# Patient Record
Sex: MALE | Race: WHITE | Employment: UNEMPLOYED | URBAN - NONMETROPOLITAN AREA
[De-identification: names, ages, dates, MRNs, and addresses within clinical notes are randomized per-mention and may not be internally consistent; named-entity substitution may affect disease eponyms.]

---

## 2019-05-24 ENCOUNTER — TRANSFERRED RECORDS (OUTPATIENT)
Dept: HEALTH INFORMATION MANAGEMENT | Facility: OTHER | Age: 59
End: 2019-05-24

## 2019-10-07 ENCOUNTER — HOSPITAL ENCOUNTER (OUTPATIENT)
Dept: RESPIRATORY THERAPY | Facility: OTHER | Age: 59
End: 2019-10-07

## 2019-10-07 PROCEDURE — 25000125 ZZHC RX 250

## 2019-10-07 PROCEDURE — 40000275 ZZH STATISTIC RCP TIME EA 10 MIN

## 2019-10-07 PROCEDURE — 94010 BREATHING CAPACITY TEST: CPT | Performed by: CHIROPRACTOR

## 2019-10-07 RX ORDER — ALBUTEROL SULFATE 0.83 MG/ML
2.5 SOLUTION RESPIRATORY (INHALATION) ONCE
Status: COMPLETED | OUTPATIENT
Start: 2019-10-07 | End: 2019-10-07

## 2019-10-07 RX ADMIN — ALBUTEROL SULFATE 2.5 MG: 2.5 SOLUTION RESPIRATORY (INHALATION) at 15:50

## 2021-05-11 ENCOUNTER — RECORDS - HEALTHEAST (OUTPATIENT)
Dept: VASCULAR SURGERY | Facility: CLINIC | Age: 61
End: 2021-05-11

## 2021-05-11 DIAGNOSIS — I87.8 CHRONIC VENOUS STASIS: ICD-10-CM

## 2021-05-11 DIAGNOSIS — L03.90 CELLULITIS: ICD-10-CM

## 2021-05-26 ENCOUNTER — COMMUNICATION - HEALTHEAST (OUTPATIENT)
Dept: VASCULAR SURGERY | Facility: CLINIC | Age: 61
End: 2021-05-26

## 2021-06-16 ENCOUNTER — HOSPITAL ENCOUNTER (INPATIENT)
Facility: CLINIC | Age: 61
LOS: 2 days | Discharge: HOME | DRG: 640 | End: 2021-06-18
Attending: EMERGENCY MEDICINE | Admitting: INTERNAL MEDICINE
Payer: MEDICARE

## 2021-06-16 ENCOUNTER — APPOINTMENT (OUTPATIENT)
Dept: CT IMAGING | Facility: CLINIC | Age: 61
DRG: 640 | End: 2021-06-16
Attending: EMERGENCY MEDICINE
Payer: MEDICARE

## 2021-06-16 ENCOUNTER — APPOINTMENT (OUTPATIENT)
Dept: GENERAL RADIOLOGY | Facility: CLINIC | Age: 61
DRG: 640 | End: 2021-06-16
Attending: EMERGENCY MEDICINE
Payer: MEDICARE

## 2021-06-16 DIAGNOSIS — J93.81 CHRONIC PNEUMOTHORAX: ICD-10-CM

## 2021-06-16 DIAGNOSIS — J44.1 COPD EXACERBATION (H): ICD-10-CM

## 2021-06-16 DIAGNOSIS — Z95.2 S/P AVR: ICD-10-CM

## 2021-06-16 DIAGNOSIS — Z86.79 S/P AORTIC ANEURYSM REPAIR: ICD-10-CM

## 2021-06-16 DIAGNOSIS — Z98.890 S/P AORTIC ANEURYSM REPAIR: ICD-10-CM

## 2021-06-16 LAB
ALBUMIN SERPL-MCNC: 3.3 G/DL (ref 3.4–5)
ALP SERPL-CCNC: 102 U/L (ref 40–150)
ALT SERPL W P-5'-P-CCNC: 37 U/L (ref 0–70)
ANION GAP SERPL CALCULATED.3IONS-SCNC: 4 MMOL/L (ref 3–14)
AST SERPL W P-5'-P-CCNC: 29 U/L (ref 0–45)
BASE EXCESS BLDV CALC-SCNC: 0.7 MMOL/L
BASE EXCESS BLDV CALC-SCNC: 2.6 MMOL/L
BASOPHILS # BLD AUTO: 0.1 10E9/L (ref 0–0.2)
BASOPHILS NFR BLD AUTO: 0.7 %
BILIRUB SERPL-MCNC: 0.5 MG/DL (ref 0.2–1.3)
BUN SERPL-MCNC: 12 MG/DL (ref 7–30)
CALCIUM SERPL-MCNC: 9.3 MG/DL (ref 8.5–10.1)
CHLORIDE SERPL-SCNC: 103 MMOL/L (ref 94–109)
CO2 BLDCOV-SCNC: 33 MMOL/L (ref 21–28)
CO2 SERPL-SCNC: 30 MMOL/L (ref 20–32)
CREAT SERPL-MCNC: 0.85 MG/DL (ref 0.66–1.25)
DIFFERENTIAL METHOD BLD: ABNORMAL
EOSINOPHIL # BLD AUTO: 0.2 10E9/L (ref 0–0.7)
EOSINOPHIL NFR BLD AUTO: 2.4 %
ERYTHROCYTE [DISTWIDTH] IN BLOOD BY AUTOMATED COUNT: 14.4 % (ref 10–15)
GFR SERPL CREATININE-BSD FRML MDRD: >90 ML/MIN/{1.73_M2}
GLUCOSE SERPL-MCNC: 105 MG/DL (ref 70–99)
HCO3 BLDV-SCNC: 29 MMOL/L (ref 21–28)
HCO3 BLDV-SCNC: 30 MMOL/L (ref 21–28)
HCT VFR BLD AUTO: 36.5 % (ref 40–53)
HGB BLD-MCNC: 11.6 G/DL (ref 13.3–17.7)
IMM GRANULOCYTES # BLD: 0 10E9/L (ref 0–0.4)
IMM GRANULOCYTES NFR BLD: 0.3 %
INTERPRETATION ECG - MUSE: NORMAL
LACTATE BLD-SCNC: 0.9 MMOL/L (ref 0.7–2)
LACTATE BLD-SCNC: 0.9 MMOL/L (ref 0.7–2.1)
LYMPHOCYTES # BLD AUTO: 1.5 10E9/L (ref 0.8–5.3)
LYMPHOCYTES NFR BLD AUTO: 16.6 %
MCH RBC QN AUTO: 31 PG (ref 26.5–33)
MCHC RBC AUTO-ENTMCNC: 31.8 G/DL (ref 31.5–36.5)
MCV RBC AUTO: 98 FL (ref 78–100)
MONOCYTES # BLD AUTO: 0.7 10E9/L (ref 0–1.3)
MONOCYTES NFR BLD AUTO: 7.6 %
NEUTROPHILS # BLD AUTO: 6.4 10E9/L (ref 1.6–8.3)
NEUTROPHILS NFR BLD AUTO: 72.4 %
NRBC # BLD AUTO: 0 10*3/UL
NRBC BLD AUTO-RTO: 0 /100
NT-PROBNP SERPL-MCNC: 2913 PG/ML (ref 0–900)
O2/TOTAL GAS SETTING VFR VENT: ABNORMAL %
O2/TOTAL GAS SETTING VFR VENT: ABNORMAL %
OXYHGB MFR BLDV: 42 %
OXYHGB MFR BLDV: 98 %
PCO2 BLDV: 61 MM HG (ref 40–50)
PCO2 BLDV: 62 MM HG (ref 40–50)
PCO2 BLDV: 67 MM HG (ref 40–50)
PH BLDV: 7.28 PH (ref 7.32–7.43)
PH BLDV: 7.3 PH (ref 7.32–7.43)
PH BLDV: 7.3 PH (ref 7.32–7.43)
PLATELET # BLD AUTO: 152 10E9/L (ref 150–450)
PO2 BLDV: 124 MM HG (ref 25–47)
PO2 BLDV: 129 MM HG (ref 25–47)
PO2 BLDV: 27 MM HG (ref 25–47)
POTASSIUM SERPL-SCNC: 4.4 MMOL/L (ref 3.4–5.3)
PROCALCITONIN SERPL-MCNC: <0.05 NG/ML
PROT SERPL-MCNC: 7.5 G/DL (ref 6.8–8.8)
RBC # BLD AUTO: 3.74 10E12/L (ref 4.4–5.9)
SAO2 % BLDV FROM PO2: 98 %
SODIUM SERPL-SCNC: 137 MMOL/L (ref 133–144)
TROPONIN I BLD-MCNC: 0.04 UG/L (ref 0–0.08)
WBC # BLD AUTO: 8.9 10E9/L (ref 4–11)

## 2021-06-16 PROCEDURE — 96372 THER/PROPH/DIAG INJ SC/IM: CPT | Performed by: EMERGENCY MEDICINE

## 2021-06-16 PROCEDURE — 83880 ASSAY OF NATRIURETIC PEPTIDE: CPT | Performed by: EMERGENCY MEDICINE

## 2021-06-16 PROCEDURE — 250N000011 HC RX IP 250 OP 636: Performed by: INTERNAL MEDICINE

## 2021-06-16 PROCEDURE — 99223 1ST HOSP IP/OBS HIGH 75: CPT | Mod: AI | Performed by: INTERNAL MEDICINE

## 2021-06-16 PROCEDURE — 250N000013 HC RX MED GY IP 250 OP 250 PS 637: Performed by: EMERGENCY MEDICINE

## 2021-06-16 PROCEDURE — 80053 COMPREHEN METABOLIC PANEL: CPT | Performed by: EMERGENCY MEDICINE

## 2021-06-16 PROCEDURE — 250N000009 HC RX 250: Performed by: INTERNAL MEDICINE

## 2021-06-16 PROCEDURE — 71045 X-RAY EXAM CHEST 1 VIEW: CPT

## 2021-06-16 PROCEDURE — 120N000001 HC R&B MED SURG/OB

## 2021-06-16 PROCEDURE — 94660 CPAP INITIATION&MGMT: CPT

## 2021-06-16 PROCEDURE — 84145 PROCALCITONIN (PCT): CPT | Performed by: INTERNAL MEDICINE

## 2021-06-16 PROCEDURE — 82803 BLOOD GASES ANY COMBINATION: CPT

## 2021-06-16 PROCEDURE — 94640 AIRWAY INHALATION TREATMENT: CPT | Mod: 76

## 2021-06-16 PROCEDURE — 83605 ASSAY OF LACTIC ACID: CPT | Performed by: EMERGENCY MEDICINE

## 2021-06-16 PROCEDURE — 82805 BLOOD GASES W/O2 SATURATION: CPT | Performed by: EMERGENCY MEDICINE

## 2021-06-16 PROCEDURE — C9803 HOPD COVID-19 SPEC COLLECT: HCPCS

## 2021-06-16 PROCEDURE — 250N000011 HC RX IP 250 OP 636: Performed by: EMERGENCY MEDICINE

## 2021-06-16 PROCEDURE — 5A09357 ASSISTANCE WITH RESPIRATORY VENTILATION, LESS THAN 24 CONSECUTIVE HOURS, CONTINUOUS POSITIVE AIRWAY PRESSURE: ICD-10-PCS | Performed by: EMERGENCY MEDICINE

## 2021-06-16 PROCEDURE — 94640 AIRWAY INHALATION TREATMENT: CPT

## 2021-06-16 PROCEDURE — 250N000009 HC RX 250: Performed by: EMERGENCY MEDICINE

## 2021-06-16 PROCEDURE — 36415 COLL VENOUS BLD VENIPUNCTURE: CPT | Performed by: INTERNAL MEDICINE

## 2021-06-16 PROCEDURE — 250N000013 HC RX MED GY IP 250 OP 250 PS 637: Performed by: INTERNAL MEDICINE

## 2021-06-16 PROCEDURE — 83605 ASSAY OF LACTIC ACID: CPT

## 2021-06-16 PROCEDURE — 84484 ASSAY OF TROPONIN QUANT: CPT

## 2021-06-16 PROCEDURE — 87635 SARS-COV-2 COVID-19 AMP PRB: CPT | Performed by: EMERGENCY MEDICINE

## 2021-06-16 PROCEDURE — 71275 CT ANGIOGRAPHY CHEST: CPT

## 2021-06-16 PROCEDURE — 99285 EMERGENCY DEPT VISIT HI MDM: CPT | Mod: 25

## 2021-06-16 PROCEDURE — 85025 COMPLETE CBC W/AUTO DIFF WBC: CPT | Performed by: EMERGENCY MEDICINE

## 2021-06-16 PROCEDURE — 96374 THER/PROPH/DIAG INJ IV PUSH: CPT | Mod: 59

## 2021-06-16 PROCEDURE — 999N000157 HC STATISTIC RCP TIME EA 10 MIN

## 2021-06-16 RX ORDER — LIDOCAINE 40 MG/G
CREAM TOPICAL
Status: DISCONTINUED | OUTPATIENT
Start: 2021-06-16 | End: 2021-06-18 | Stop reason: HOSPADM

## 2021-06-16 RX ORDER — METHYLPREDNISOLONE SODIUM SUCCINATE 125 MG/2ML
125 INJECTION, POWDER, LYOPHILIZED, FOR SOLUTION INTRAMUSCULAR; INTRAVENOUS ONCE
Status: COMPLETED | OUTPATIENT
Start: 2021-06-16 | End: 2021-06-16

## 2021-06-16 RX ORDER — IOPAMIDOL 755 MG/ML
500 INJECTION, SOLUTION INTRAVASCULAR ONCE
Status: COMPLETED | OUTPATIENT
Start: 2021-06-16 | End: 2021-06-16

## 2021-06-16 RX ORDER — BUDESONIDE 0.5 MG/2ML
0.5 INHALANT ORAL 2 TIMES DAILY
Status: DISCONTINUED | OUTPATIENT
Start: 2021-06-16 | End: 2021-06-18 | Stop reason: HOSPADM

## 2021-06-16 RX ORDER — OXYCODONE HYDROCHLORIDE 5 MG/1
5-10 TABLET ORAL EVERY 6 HOURS PRN
COMMUNITY

## 2021-06-16 RX ORDER — ONDANSETRON 2 MG/ML
4 INJECTION INTRAMUSCULAR; INTRAVENOUS EVERY 6 HOURS PRN
Status: DISCONTINUED | OUTPATIENT
Start: 2021-06-16 | End: 2021-06-18 | Stop reason: HOSPADM

## 2021-06-16 RX ORDER — ACETAMINOPHEN 650 MG/1
650 SUPPOSITORY RECTAL EVERY 4 HOURS PRN
Status: DISCONTINUED | OUTPATIENT
Start: 2021-06-16 | End: 2021-06-18 | Stop reason: HOSPADM

## 2021-06-16 RX ORDER — HYDRALAZINE HYDROCHLORIDE 20 MG/ML
10 INJECTION INTRAMUSCULAR; INTRAVENOUS EVERY 4 HOURS PRN
Status: DISCONTINUED | OUTPATIENT
Start: 2021-06-16 | End: 2021-06-18 | Stop reason: HOSPADM

## 2021-06-16 RX ORDER — IPRATROPIUM BROMIDE AND ALBUTEROL SULFATE 2.5; .5 MG/3ML; MG/3ML
3 SOLUTION RESPIRATORY (INHALATION)
Status: DISCONTINUED | OUTPATIENT
Start: 2021-06-16 | End: 2021-06-18 | Stop reason: HOSPADM

## 2021-06-16 RX ORDER — NALOXONE HYDROCHLORIDE 0.4 MG/ML
0.2 INJECTION, SOLUTION INTRAMUSCULAR; INTRAVENOUS; SUBCUTANEOUS
Status: DISCONTINUED | OUTPATIENT
Start: 2021-06-16 | End: 2021-06-18 | Stop reason: HOSPADM

## 2021-06-16 RX ORDER — ALBUTEROL SULFATE 90 UG/1
1-2 AEROSOL, METERED RESPIRATORY (INHALATION) EVERY 4 HOURS PRN
COMMUNITY

## 2021-06-16 RX ORDER — NALOXONE HYDROCHLORIDE 0.4 MG/ML
0.4 INJECTION, SOLUTION INTRAMUSCULAR; INTRAVENOUS; SUBCUTANEOUS
Status: DISCONTINUED | OUTPATIENT
Start: 2021-06-16 | End: 2021-06-18 | Stop reason: HOSPADM

## 2021-06-16 RX ORDER — LANOLIN ALCOHOL/MO/W.PET/CERES
5000 CREAM (GRAM) TOPICAL DAILY
COMMUNITY

## 2021-06-16 RX ORDER — LOSARTAN POTASSIUM 50 MG/1
50 TABLET ORAL DAILY
COMMUNITY

## 2021-06-16 RX ORDER — FUROSEMIDE 20 MG
20 TABLET ORAL DAILY
Status: DISCONTINUED | OUTPATIENT
Start: 2021-06-17 | End: 2021-06-17

## 2021-06-16 RX ORDER — IPRATROPIUM BROMIDE AND ALBUTEROL SULFATE 2.5; .5 MG/3ML; MG/3ML
3 SOLUTION RESPIRATORY (INHALATION) ONCE
Status: COMPLETED | OUTPATIENT
Start: 2021-06-16 | End: 2021-06-16

## 2021-06-16 RX ORDER — ONDANSETRON 4 MG/1
4 TABLET, ORALLY DISINTEGRATING ORAL EVERY 6 HOURS PRN
Status: DISCONTINUED | OUTPATIENT
Start: 2021-06-16 | End: 2021-06-18 | Stop reason: HOSPADM

## 2021-06-16 RX ORDER — FUROSEMIDE 10 MG/ML
40 INJECTION INTRAMUSCULAR; INTRAVENOUS ONCE
Status: COMPLETED | OUTPATIENT
Start: 2021-06-16 | End: 2021-06-16

## 2021-06-16 RX ORDER — NITROGLYCERIN 0.4 MG/1
0.4 TABLET SUBLINGUAL EVERY 5 MIN PRN
Status: DISCONTINUED | OUTPATIENT
Start: 2021-06-16 | End: 2021-06-18 | Stop reason: HOSPADM

## 2021-06-16 RX ORDER — ACETAMINOPHEN 325 MG/1
650 TABLET ORAL EVERY 4 HOURS PRN
Status: DISCONTINUED | OUTPATIENT
Start: 2021-06-16 | End: 2021-06-18 | Stop reason: HOSPADM

## 2021-06-16 RX ORDER — ASPIRIN 81 MG/1
81 TABLET ORAL DAILY
Status: DISCONTINUED | OUTPATIENT
Start: 2021-06-16 | End: 2021-06-18 | Stop reason: HOSPADM

## 2021-06-16 RX ORDER — CEPHALEXIN 500 MG/1
500 CAPSULE ORAL EVERY 8 HOURS
COMMUNITY
Start: 2021-06-16 | End: 2021-06-22

## 2021-06-16 RX ORDER — BISACODYL 10 MG
10 SUPPOSITORY, RECTAL RECTAL DAILY PRN
Status: DISCONTINUED | OUTPATIENT
Start: 2021-06-16 | End: 2021-06-18 | Stop reason: HOSPADM

## 2021-06-16 RX ORDER — ASPIRIN 81 MG/1
81 TABLET ORAL DAILY
COMMUNITY

## 2021-06-16 RX ORDER — OXYCODONE HYDROCHLORIDE 5 MG/1
5 TABLET ORAL ONCE
Status: COMPLETED | OUTPATIENT
Start: 2021-06-16 | End: 2021-06-16

## 2021-06-16 RX ORDER — TERBUTALINE SULFATE 1 MG/ML
0.25 INJECTION, SOLUTION SUBCUTANEOUS ONCE
Status: COMPLETED | OUTPATIENT
Start: 2021-06-16 | End: 2021-06-16

## 2021-06-16 RX ORDER — AMOXICILLIN 250 MG
1 CAPSULE ORAL 2 TIMES DAILY PRN
Status: DISCONTINUED | OUTPATIENT
Start: 2021-06-16 | End: 2021-06-18 | Stop reason: HOSPADM

## 2021-06-16 RX ORDER — IPRATROPIUM BROMIDE AND ALBUTEROL SULFATE 2.5; .5 MG/3ML; MG/3ML
3 SOLUTION RESPIRATORY (INHALATION)
Status: DISCONTINUED | OUTPATIENT
Start: 2021-06-16 | End: 2021-06-16

## 2021-06-16 RX ORDER — NITROGLYCERIN 0.4 MG/1
0.4 TABLET SUBLINGUAL ONCE
Status: COMPLETED | OUTPATIENT
Start: 2021-06-16 | End: 2021-06-16

## 2021-06-16 RX ORDER — METOPROLOL TARTRATE 25 MG/1
12.5 TABLET, FILM COATED ORAL 2 TIMES DAILY
COMMUNITY

## 2021-06-16 RX ORDER — ALBUTEROL SULFATE 0.83 MG/ML
2.5 SOLUTION RESPIRATORY (INHALATION)
Status: DISCONTINUED | OUTPATIENT
Start: 2021-06-16 | End: 2021-06-18 | Stop reason: HOSPADM

## 2021-06-16 RX ORDER — OXYCODONE HYDROCHLORIDE 5 MG/1
5-10 TABLET ORAL EVERY 4 HOURS PRN
Status: DISCONTINUED | OUTPATIENT
Start: 2021-06-16 | End: 2021-06-18 | Stop reason: HOSPADM

## 2021-06-16 RX ORDER — HYDROMORPHONE HCL IN WATER/PF 6 MG/30 ML
0.2 PATIENT CONTROLLED ANALGESIA SYRINGE INTRAVENOUS
Status: DISCONTINUED | OUTPATIENT
Start: 2021-06-16 | End: 2021-06-18 | Stop reason: HOSPADM

## 2021-06-16 RX ORDER — FUROSEMIDE 20 MG
20 TABLET ORAL DAILY
COMMUNITY

## 2021-06-16 RX ORDER — ACETAMINOPHEN 500 MG
500-1000 TABLET ORAL EVERY 6 HOURS PRN
COMMUNITY

## 2021-06-16 RX ORDER — BUDESONIDE 0.5 MG/2ML
0.5 INHALANT ORAL 2 TIMES DAILY
COMMUNITY

## 2021-06-16 RX ORDER — METHYLPREDNISOLONE SODIUM SUCCINATE 40 MG/ML
40 INJECTION, POWDER, LYOPHILIZED, FOR SOLUTION INTRAMUSCULAR; INTRAVENOUS EVERY 8 HOURS
Status: DISCONTINUED | OUTPATIENT
Start: 2021-06-16 | End: 2021-06-17

## 2021-06-16 RX ORDER — AMOXICILLIN 250 MG
2 CAPSULE ORAL 2 TIMES DAILY PRN
Status: DISCONTINUED | OUTPATIENT
Start: 2021-06-16 | End: 2021-06-18 | Stop reason: HOSPADM

## 2021-06-16 RX ADMIN — SODIUM CHLORIDE 80 ML: 9 INJECTION, SOLUTION INTRAVENOUS at 11:11

## 2021-06-16 RX ADMIN — TERBUTALINE SULFATE 0.25 MG: 1 INJECTION SUBCUTANEOUS at 09:52

## 2021-06-16 RX ADMIN — FUROSEMIDE 40 MG: 10 INJECTION, SOLUTION INTRAMUSCULAR; INTRAVENOUS at 18:00

## 2021-06-16 RX ADMIN — BUDESONIDE 0.5 MG: 0.5 INHALANT RESPIRATORY (INHALATION) at 19:44

## 2021-06-16 RX ADMIN — IPRATROPIUM BROMIDE AND ALBUTEROL SULFATE 3 ML: .5; 3 SOLUTION RESPIRATORY (INHALATION) at 19:44

## 2021-06-16 RX ADMIN — IOPAMIDOL 80 ML: 755 INJECTION, SOLUTION INTRAVENOUS at 11:10

## 2021-06-16 RX ADMIN — OXYCODONE HYDROCHLORIDE 5 MG: 5 TABLET ORAL at 14:17

## 2021-06-16 RX ADMIN — Medication 12.5 MG: at 21:19

## 2021-06-16 RX ADMIN — METHYLPREDNISOLONE 40 MG: 40 INJECTION, POWDER, LYOPHILIZED, FOR SOLUTION INTRAMUSCULAR; INTRAVENOUS at 17:59

## 2021-06-16 RX ADMIN — OXYCODONE HYDROCHLORIDE 10 MG: 5 TABLET ORAL at 21:19

## 2021-06-16 RX ADMIN — NITROGLYCERIN 0.4 MG: 0.4 TABLET, ORALLY DISINTEGRATING SUBLINGUAL at 09:55

## 2021-06-16 RX ADMIN — METHYLPREDNISOLONE SODIUM SUCCINATE 125 MG: 125 INJECTION, POWDER, FOR SOLUTION INTRAMUSCULAR; INTRAVENOUS at 09:44

## 2021-06-16 RX ADMIN — IPRATROPIUM BROMIDE AND ALBUTEROL SULFATE 3 ML: .5; 3 SOLUTION RESPIRATORY (INHALATION) at 14:07

## 2021-06-16 RX ADMIN — ASPIRIN 81 MG: 81 TABLET ORAL at 17:58

## 2021-06-16 ASSESSMENT — ACTIVITIES OF DAILY LIVING (ADL)
ADLS_ACUITY_SCORE: 19
ADLS_ACUITY_SCORE: 18

## 2021-06-16 ASSESSMENT — ENCOUNTER SYMPTOMS
WHEEZING: 1
FEVER: 0
SLEEP DISTURBANCE: 1
SHORTNESS OF BREATH: 1

## 2021-06-16 ASSESSMENT — MIFFLIN-ST. JEOR: SCORE: 1575.1

## 2021-06-16 NOTE — ED NOTES
[facility]  ED Nurse Handoff Report    Dieudonne Chairez is a 60 year old male   ED Chief complaint: Shortness of Breath  . ED Diagnosis:   Final diagnoses:   None     Allergies: No Known Allergies    Code Status: Full Code  Activity level - Baseline/Home:  Independent. Activity Level - Current:   Assist of 1. Lift room needed: No. Bariatric: No   Needed: No   Isolation: No. Infection: Not Applicable.     Vital Signs:   Vitals:    06/16/21 1245 06/16/21 1300 06/16/21 1315 06/16/21 1330   BP: 104/60 114/61 132/76 116/69   Pulse: 86 87 97 92   Resp: 16 16 16 19   SpO2: 97% 97% 97% 96%       Cardiac Rhythm:  ,      Pain level:    Patient confused: No. Patient Falls Risk: Yes.   Elimination Status: Has voided   Patient Report - Initial Complaint: SOB. Focused Assessment: tachepnic, purse lipped breathing requiring BIPAP. Improved and now on 3 L NC   Tests Performed: Chest xray, chest CT, blood work. Abnormal Results:   Labs Ordered and Resulted from Time of ED Arrival Up to the Time of Departure from the ED   BLOOD GAS VENOUS AND OXYHGB - Abnormal; Notable for the following components:       Result Value    Ph Venous 7.28 (*)     PCO2 Venous 62 (*)     PO2 Venous 124 (*)     Bicarbonate Venous 29 (*)     All other components within normal limits   CBC WITH PLATELETS DIFFERENTIAL - Abnormal; Notable for the following components:    RBC Count 3.74 (*)     Hemoglobin 11.6 (*)     Hematocrit 36.5 (*)     All other components within normal limits   COMPREHENSIVE METABOLIC PANEL - Abnormal; Notable for the following components:    Glucose 105 (*)     Albumin 3.3 (*)     All other components within normal limits   NT PROBNP INPATIENT - Abnormal; Notable for the following components:    N-Terminal Pro BNP Inpatient 2,913 (*)     All other components within normal limits   BLOOD GAS VENOUS AND OXYHGB - Abnormal; Notable for the following components:    Ph Venous 7.30 (*)     PCO2 Venous 61 (*)      Bicarbonate Venous 30 (*)     All other components within normal limits   ISTAT  GASES LACTATE ARISTEO POCT - Abnormal; Notable for the following components:    Ph Venous 7.30 (*)     PCO2 Venous 67 (*)     PO2 Venous 129 (*)     Bicarbonate Venous 33 (*)     All other components within normal limits   LACTIC ACID WHOLE BLOOD   ISTAT TROPONIN NURSING POCT   ISTAT CG4 GASES LACTATE ARISTEO NURSING POCT   TROPONIN POCT     CTA Chest with Contrast   Preliminary Result   IMPRESSION:   1. No evidence for pulmonary embolism.   2. Small right pleural effusion.   3. Postsurgical changes of an aortic valve replacement and tube graft   reconstruction of the ascending thoracic aorta. Mild hematoma with   tiny pockets of air about the tube graft, air deep and superficial to   a median sternotomy, and subcutaneous air in the right upper anterior   abdominal wall most likely are postsurgical in nature.      XR Chest Port 1 View   Final Result   IMPRESSION: Poststernotomy with valve replacement/repair. Monitoring   leads overlie the chest. Lower left chest pigtail drainage tube.   Pulmonary hyperinflation. No definite pneumothorax. Blunting of the   right costophrenic angle may reflect scarring or trace pleural fluid.   The lungs are otherwise clear. Normal heart size. No overt vascular   congestion or pulmonary edema.      ARTHUR OSORIO MD        .   Treatments provided: BIPAP, fluids, Nitroglycerin, Mag, terbutaline   Family Comments: Daughter at bedside  OBS brochure/video discussed/provided to patient:  No  ED Medications:   Medications   ipratropium - albuterol 0.5 mg/2.5 mg/3 mL (DUONEB) neb solution 3 mL (has no administration in time range)   nitroGLYcerin (NITROSTAT) sublingual tablet 0.4 mg (0.4 mg Sublingual Given 6/16/21 0955)   terbutaline (BRETHINE) injection 0.25 mg (0.25 mg Subcutaneous Given 6/16/21 0904)   methylPREDNISolone sodium succinate (solu-MEDROL) injection 125 mg (125 mg Intravenous Given 6/16/21 0915)    iopamidol (ISOVUE-370) solution 500 mL (80 mLs Intravenous Given 6/16/21 1110)   sodium chloride 0.9 % bag 500mL for CT scan flush use (80 mLs As instructed Given 6/16/21 1111)     Drips infusing:  No  For the majority of the shift, the patient's behavior Green. Interventions performed were NA.    Sepsis treatment initiated: No     Patient tested for COVID 19 prior to admission: YES    ED Nurse Name/Phone Number: Carla Gabriel RN,   1:37 PM  RECEIVING UNIT ED HANDOFF REVIEW    Above ED Nurse Handoff Report was reviewed:YEs  Reviewed by: Bridgett Blackburn, RN on June 16, 2021 at 3:45 PM   Did you vocera the ED RN: Yes

## 2021-06-16 NOTE — PLAN OF CARE
"16:00  Pt arrived to [facility] from E.D.  Assisted into bed and oriented to room.  Continue plan of care.    17:55 Text page sent to MD, \"Pt requesting TEDs stockings.  Is it ok if I order some for him?  Thanks\"    MD returned call.  Said its ok to order Amrit stockings  "

## 2021-06-16 NOTE — ED PROVIDER NOTES
History   Chief Complaint:  Shortness of Breath     The history is provided by the patient and the EMS personnel.      Dieudonne Chairez is a 60 year old male with history of aortic stenosis, hypertension and COPD who presents with shortness of breath. EMS tells us that they presented to the patients home after he called for shortness of breath. They also tell us that he had open heart surgery for an aortic aneurysm two and a half weks ago as well as a lower left lobe lugncollapse and was discharged yesterday from [place]. When EMS arrived at the patients home he was stating at 96% on 4L but continued to appear distressed. While in the ED the patient tells us that yesterday evening he had a sudden onset of shortness of breath that kept him from sleeping. The patient denies chest pain or fever.     Review of Systems   Constitutional: Negative for fever.   Respiratory: Positive for shortness of breath and wheezing.    Cardiovascular: Negative for chest pain.   Psychiatric/Behavioral: Positive for sleep disturbance.   All other systems reviewed and are negative.    Allergies:  The patient has no known allergies.     Medications:  Albuterol   Aspirin 81 mg   Cyanocobalamin   Combiventin  Lasix  Cozaar  Lopressor  Roxicodone  Amoxicillin   Keflex    Past Medical History:    Aortic stenosis  COPD  Hypertension     Past Surgical History:    Cardiac catheterization   Aortic valve replacement  Thoracic aortic valve replacement  Bronchoscopy     Family History:    COPD  Atrial fibrillation   Stroke   Hypertension   Stroke   Bicuspid AV    Social History:  The patient presents to the ED via EMS.     Physical Exam     Patient Vitals for the past 24 hrs:   BP Pulse Resp SpO2   06/16/21 0947 (!) 200/94 114 20 100 %       Physical Exam    Constitutional: Alert, attentive, GCS 15.Labored breathing, moderate distress, tripod position and speaking in short sentences.   HENT:    Nose: Nose normal.    Mouth/Throat: Oropharynx is  clear, mucous membranes are moist  Eyes: EOM are normal, anicteric, conjugate gaze  CV: tachycardic and rhythm; no murmur, sLarge sternotomy incision, margins are clean, dry and in tact.   Chest:  movement bilaterally. No wheezing. Left posterior pig tail catheter in place, skin appears c/d/i.   GI:  non tender. No distension. No guarding or rebound.    MSK: 2+ lower extremity edema.   Neurological: Alert, attentive, moving all extremities equally.   Skin: Skin is warm and dry.    Emergency Department Course   ECG  ECG taken at 0950, ECG read at 0950  Sinus tachycardia. Possible left atrial enlargement. Left axis deviation. Anteroseptal infarct, age undetermined. ST & T wave abnormality, consider lateral ischemia. Abnormal ECG.  Rate 107 bpm. MO interval 174 ms. QRS duration 90 ms. QT/QTc 328/437 ms. P-R-T axes 79 -84 103.     Imaging:    XR Port Chest 1 View:  Poststernotomy with valve replacement/repair. Monitoring  leads overlie the chest. Lower left chest pigtail drainage tube.  Pulmonary hyperinflation. No definite pneumothorax. Blunting of the  right costophrenic angle may reflect scarring or trace pleural fluid.  The lungs are otherwise clear. Normal heart size. No overt vascular  congestion or pulmonary edema. Reading per radiology.    CTA Chest w contrast:   1. No evidence for pulmonary embolism.  2. Small right pleural effusion.  3. Postsurgical changes of an aortic valve replacement and tube graft  reconstruction of the ascending thoracic aorta. Mild hematoma with  tiny pockets of air about the tube graft, air deep and superficial to  a median sternotomy, and subcutaneous air in the right upper anterior  abdominal wall most likely are postsurgical in nature. Reading per radiology.     Laboratory:    CBC: WBC 8.9, HGB 11.6(L),    CMP: Glucose 105(H); Albumin 3.3(L) o/w WNL (Creatinine 0.85)   Lactic acid (result time 0953) 0.9   Troponin (Collected 0952): 0.04  0954 VBG: pH: 7.30(L), PCO2: 67(H),  PO2: 129(H), Bicarbonate: 33(H), o/w WNL  1131 VBG: pH: 7.30(L), PCO2: 61(H), PO2: 27, Bicarbonate: 30(H), o/w WNL  BNP - 2,913(H)    Emergency Department Course:    Reviewed:  I reviewed nursing notes, vitals, past medical history and care everywhere    0940 I arrive at patients bedside  0941 Stat portable chest called for   0944 Solumedrol given   0946 CPAP in place   0947 Portable Xray taken   0955 I rechecked the patient and talked with his daughter, who is at the patients bedside, to gain further history.   1010 I rechecked the patient and discussed the findings and plan.   1035 I spoke with Dr. Gonzales, cardiothoracic surgeon, regarding this patient.   1055 I rechecked the patient at this time and discussed with him my conversation with the cardiothoracic surgeon and the plan of care moving forward.   1315 I spoke with Dr. Terrell regarding this patient.  1335 I spoke with Jimmie, the OR nurse for Dr. Gonzales, regarding this patient.    1430   I spoke with Dr. Zhu.   1430   Chest tube taken off suction and flutter valve replaced    Interventions:  0944 Solu-Medrol 125 mg IV   0952 Brethine 0.25 mg IV  0954 Nitrostat 0.4 mg sublingual  1110 Iopamidol 80 mL IV     Disposition:  The patient was admitted to the hospital under the care of Dr. Zhu.     Impression & Plan     Medical Decision Makin-year-old male past medical history significant for COPD who was discharged from [facility] in [facility] the day prior after 2-week hospitalization for open aortic valve replacement and aortic root aneurysm repair with postop course complicated by persistent left-sided pneumothorax status post pigtail catheter placement currently with a flutter valve presenting for sudden onset increased shortness of breath.  EMS noted sats in the 80s, on arrival here he was pursed lipped breathing, speaking in one-word sentences in the tripod position.  Patient was immediately placed on BiPAP after point-of-care ultrasound  showed no evidence of pneumothorax on the left confirmed on chest x-ray which shows his pigtail catheter in good position.  He was given DuoNeb's x3, Solu-Medrol, mag and terbutaline with significant improvement in his respiratory status and work of breathing.  Initial blood gas does show chronic CO2 retention and this was improved after BiPAP.  I did discuss this presentation with on-call CV surgeon through [facility], Dr. Hampton, who felt if there is no evidence of postop complication patient does not require transfer back to the [facility] system in [place].  CT PE study/aortic survey was negative except for postoperative changes given his significant improvement with treatment for COPD, I suspect this is the primary cause of his worsening respiratory status.  While on BiPAP, I did place his pigtail catheter to suction, did not have any air leak and after he was taken off BiPAP back onto nasal cannula this was hooked back up to a flutter valve.  In discussion with hospitalist service here, given no need for surgical intervention and isolated COPD exacerbation as the cause of his presentation they are comfortable keeping the patient here at [facility] and this was communicated back to the CV surgery team at [facility].  He does not have any fevers, he is currently on Keflex for a skin infection surrounding his pigtail catheter see no negation for antibiotic adjustment at this time.  He was admitted to medicine for continued treatment of COPD exacerbation in the setting of his postoperative period.    Critical Care Time: was 95 minutes for this patient excluding procedures    Covid-19  Dieudonne Chairez was evaluated during a global COVID-19 pandemic, which necessitated consideration that the patient might be at risk for infection with the SARS-CoV-2 virus that causes COVID-19.   Applicable protocols for evaluation were followed during the patient's care.   COVID-19 was considered as part of the patient's  evaluation. The plan for testing is:  a test was obtained during this visit.    Diagnosis:    ICD-10-CM    1. COPD exacerbation (H)  J44.1 Asymptomatic SARS-CoV-2 COVID-19 Virus (Coronavirus) by PCR   2. S/P AVR  Z95.2    3. S/P aortic aneurysm repair  Z98.890     Z86.79    4. Chronic pneumothorax  J93.81     Present on arrival. pigtail catheter in place     Ck Lundy MD  [employer]  3:05 PM 06/16/21     Scribe Disclosure:  Erlin KAM, am serving as a scribe at 9:48 AM on 6/16/2021 to document services personally performed by Ck Lundy MD, based on my observations and the provider's statements to me.          Ck Lundy MD  06/16/21 3543

## 2021-06-16 NOTE — ED TRIAGE NOTES
Pt arrives via EMS from home after experiencing SOB this morning. He is s/p open heart surgery for an aortic aneurysm 2.5 wks ago as well as a lower left lobe lung collapse and was discharged from [place] yesterday. Upon arrival he is sating 96% on 4L NC but continues to appear in distress, tacheypnic and utilizing pursed lipped breathing. Placed on BIPAP. 12 lead in rout was unremarkable.

## 2021-06-16 NOTE — PROGRESS NOTES
Pt weaned off bipap to 3L nasal cannula. Pt currently showing no signs of respiratory distress.    Respiratory Care will continue to follow and monitor this patient.   Ebony Kendrick  LRT, RRT,

## 2021-06-16 NOTE — H&P
[facility]    History and Physical - Hospitalist Service       Date of Admission:  6/16/2021    Assessment & Plan   Dieudonne Chairez is a 60 year old male admitted on 6/16/2021. He has a past medical history significant for hypertension, COPD, tobacco use disorder, aortic stenosis, aortic aneurysm, and aortic valve replacement with aortic aneurysm repair 2 weeks ago.  He discharged from hospital after his aortic valve replacement with aortic aneurysm repair yesterday.  He returned to emergency room today due to shortness of breath.    1.  COPD exacerbation.  Initially requiring BiPAP therapy in the emergency room.  Improved with nebs.  Currently oxygenating well and symptoms much improved on 3 L of supplemental oxygen per nasal cannula.  -Start IV methylprednisolone 40 mg every 8 hours.  -Duo nebs 4 times a day.  -Albuterol more often if needed.  -Supplemental oxygen.  -Check procalcitonin level.    2.  Acute hypoxic and hypercapnic respiratory failure.  Possibly multifactorial.  COPD exacerbation contributing.  Suspect recent open heart surgery with some fluid overload likely also contributing.  -IV furosemide 40 mg once.  -Treat COPD as noted above.  -Supplemental oxygen as needed.    3.  Fluid overload.  In setting of recent aortic valve replacement and aortic aneurysm repair.  -Give IV furosemide 40 mg once now.  -Weigh daily.  -Strict intake and output monitoring.  -Cardiology consult.    4.  Recent pneumothorax with chest tube in place.  No obvious residual pneumothorax on imaging.  -Thoracic surgery consult to manage chest tube.    5.  Tobacco use disorder.  Last cigarette more than 2 weeks ago.  -I did encourage continued smoking cessation.    6.  Recent aortic valve replacement.    -Cardiology consult.    7.  Hypertension.  Quite hypertensive at time of presentation.  Blood pressures actually became low during time in ER.  -Restart metoprolol 12.5 mg twice a day.  -Hold losartan  tonight.  Reevaluate in the morning.  -IV hydralazine if needed.     Diet: Combination Diet Low Saturated Fat Na <2400mg Diet    DVT Prophylaxis: Pneumatic Compression Devices  Farah Catheter: not present  Code Status: Full Code           Disposition Plan   Expected discharge: 2 - 3 days, recommended to prior living arrangement     DO CK [facility]  Contact information available via [facility] Paging/Directory      ______________________________________________________________________    Chief Complaint   Shortness of breath.    History is obtained from the patient    History of Present Illness   Dieudonne Chairez is a 60 year old male who has a past medical history significant for hypertension, COPD, tobacco use disorder, aortic stenosis, aortic aneurysm, and aortic valve replacement with aortic aneurysm repair 2 weeks ago.  His surgery was performed in [facility].  He discharged from the hospital yesterday.  He traveled to see his daughter in [facility] yesterday.  Began feeling short of breath yesterday afternoon after arriving at his daughter's house.  Continued to feel short of breath overnight.  Shortness of breath was getting worse.  He did try his inhalers at home with no improvement in symptoms.  Felt that he could not catch his breath at all today.  Noticing wheezing.  Presented to emergency room for further evaluation.  He is not having any new chest pain with this.  Does still have some soreness at his surgery site.  No significant cough.  No fevers or chills.  No other acute complaints.  Feeling much better after medications given in the ER.    Review of Systems    The 10 point Review of Systems is negative other than noted in the HPI     Past Medical History    I have reviewed this patient's medical history and updated it with pertinent information if needed.     Hypertension.  COPD.  Tobacco use disorder.  Aortic stenosis.  Status post aortic valve  replacement.  Aortic aneurysm status post repair.    Past Surgical History   I have reviewed this patient's surgical history and updated it with pertinent information if needed.    Social History   I have reviewed this patient's social history and updated it with pertinent information if needed.  Quit smoking 2 weeks ago.  Occasionally drinks alcohol.    Family History     Multiple family members with COPD.  Mother with atrial fibrillation and stroke.    Prior to Admission Medications   Prior to Admission Medications   Prescriptions Last Dose Informant Patient Reported? Taking?   Ipratropium-Albuterol (COMBIVENT IN)   Yes Yes   Sig: Inhale 1 puff into the lungs 4 times daily   acetaminophen (TYLENOL) 500 MG tablet 6/16/2021 at am  Yes Yes   Sig: Take 500-1,000 mg by mouth every 6 hours as needed for mild pain   albuterol (PROAIR HFA/PROVENTIL HFA/VENTOLIN HFA) 108 (90 Base) MCG/ACT inhaler prn  Yes Yes   Sig: Inhale 1-2 puffs into the lungs every 4 hours as needed for shortness of breath / dyspnea or wheezing   aspirin 81 MG EC tablet Didn't start yet  Yes Yes   Sig: Take 81 mg by mouth daily   budesonide (PULMICORT) 0.5 MG/2ML neb solution 6/16/2021 at am  Yes Yes   Sig: Take 0.5 mg by nebulization 2 times daily   cephALEXin (KEFLEX) 500 MG capsule 6/16/2021 at am  Yes Yes   Sig: Take 500 mg by mouth every 8 hours x7 days   cyanocobalamin (VITAMIN B-12) 1000 MCG tablet   Yes Yes   Sig: Take 5,000 mcg by mouth daily   furosemide (LASIX) 20 MG tablet 6/16/2021 at am  Yes Yes   Sig: Take 20 mg by mouth daily   losartan (COZAAR) 50 MG tablet 6/16/2021 at am  Yes Yes   Sig: Take 50 mg by mouth daily   metoprolol tartrate (LOPRESSOR) 25 MG tablet 6/16/2021 at am  Yes Yes   Sig: Take 12.5 mg by mouth 2 times daily   oxyCODONE (ROXICODONE) 5 MG tablet prn  Yes Yes   Sig: Take 5-10 mg by mouth every 6 hours as needed for severe pain      Facility-Administered Medications: None     Allergies   No Known Allergies    Physical  Exam   Vital Signs: Temp: 99.5  F (37.5  C) Temp src: Oral BP: 130/72 Pulse: 102   Resp: 24 SpO2: 98 % O2 Device: Nasal cannula Oxygen Delivery: 2.5 LPM  Weight: 173 lbs 0 oz    Gen:  NAD, A&Ox3.  Eyes:  PERRL, sclera anicteric.  OP:  MMM, no lesions.  Neck:  Supple.  CV:  Regular, no murmurs.  Lung:  Mild wheeze b/l, normal effort.  Ab:  +BS, soft.  Skin:  Warm, dry to touch.  No rash.  Ext:  No pitting edema LE b/l.      Data   Data reviewed today: I reviewed all medications, new labs and imaging results over the last 24 hours. I personally reviewed the EKG tracing showing Sinus rhythm, no obvious acute ischemia.    Recent Labs   Lab 06/16/21  0953 06/16/21  0952   WBC 8.9  --    HGB 11.6*  --    MCV 98  --      --      --    POTASSIUM 4.4  --    CHLORIDE 103  --    CO2 30  --    BUN 12  --    CR 0.85  --    ANIONGAP 4  --    ISAAC 9.3  --    *  --    ALBUMIN 3.3*  --    PROTTOTAL 7.5  --    BILITOTAL 0.5  --    ALKPHOS 102  --    ALT 37  --    AST 29  --    TROPONIN  --  0.04

## 2021-06-16 NOTE — PHARMACY-ADMISSION MEDICATION HISTORY
Admission medication history interview status for this patient is complete. See [facility] admission navigator for allergy information, prior to admission medications and immunization status.     Medication history interview done, indicate source(s): Patient and Family  Medication history resources (including written lists, pill bottles, clinic record): Care Everywhere records  Pharmacy:      Changes made to PTA medication list:  Added:  All meds  Deleted:  None  Changed:  None    Actions taken by pharmacist (provider contacted, etc):None     Additional medication history information:None    Medication reconciliation/reorder completed by provider prior to medication history?  No      Prior to Admission medications    Medication Sig Last Dose Taking? Auth Provider   acetaminophen (TYLENOL) 500 MG tablet Take 500-1,000 mg by mouth every 6 hours as needed for mild pain 6/16/2021 at am Yes Unknown, Entered By History   albuterol (PROAIR HFA/PROVENTIL HFA/VENTOLIN HFA) 108 (90 Base) MCG/ACT inhaler Inhale 1-2 puffs into the lungs every 4 hours as needed for shortness of breath / dyspnea or wheezing prn Yes Unknown, Entered By History   aspirin 81 MG EC tablet Take 81 mg by mouth daily Didn't start yet Yes Unknown, Entered By History   budesonide (PULMICORT) 0.5 MG/2ML neb solution Take 0.5 mg by nebulization 2 times daily 6/16/2021 at am Yes Unknown, Entered By History   cephALEXin (KEFLEX) 500 MG capsule Take 500 mg by mouth every 8 hours x7 days 6/16/2021 at am Yes Unknown, Entered By History   cyanocobalamin (VITAMIN B-12) 1000 MCG tablet Take 5,000 mcg by mouth daily  Yes Unknown, Entered By History   furosemide (LASIX) 20 MG tablet Take 20 mg by mouth daily 6/16/2021 at am Yes Unknown, Entered By History   Ipratropium-Albuterol (COMBIVENT IN) Inhale 1 puff into the lungs 4 times daily  Yes Unknown, Entered By History   losartan (COZAAR) 50 MG tablet Take 50 mg by mouth daily 6/16/2021 at am Yes Unknown, Entered By History    metoprolol tartrate (LOPRESSOR) 25 MG tablet Take 12.5 mg by mouth 2 times daily 6/16/2021 at am Yes Unknown, Entered By History   oxyCODONE (ROXICODONE) 5 MG tablet Take 5-10 mg by mouth every 6 hours as needed for severe pain prn Yes Unknown, Entered By History

## 2021-06-17 LAB
ALBUMIN SERPL-MCNC: 2.7 G/DL (ref 3.4–5)
ALP SERPL-CCNC: 79 U/L (ref 40–150)
ALT SERPL W P-5'-P-CCNC: 33 U/L (ref 0–70)
ANION GAP SERPL CALCULATED.3IONS-SCNC: 5 MMOL/L (ref 3–14)
AST SERPL W P-5'-P-CCNC: 21 U/L (ref 0–45)
BILIRUB SERPL-MCNC: 0.3 MG/DL (ref 0.2–1.3)
BUN SERPL-MCNC: 20 MG/DL (ref 7–30)
CALCIUM SERPL-MCNC: 8.4 MG/DL (ref 8.5–10.1)
CHLORIDE SERPL-SCNC: 103 MMOL/L (ref 94–109)
CO2 SERPL-SCNC: 29 MMOL/L (ref 20–32)
CREAT SERPL-MCNC: 0.91 MG/DL (ref 0.66–1.25)
ERYTHROCYTE [DISTWIDTH] IN BLOOD BY AUTOMATED COUNT: 14.1 % (ref 10–15)
GFR SERPL CREATININE-BSD FRML MDRD: 90 ML/MIN/{1.73_M2}
GLUCOSE SERPL-MCNC: 154 MG/DL (ref 70–99)
HCT VFR BLD AUTO: 30.3 % (ref 40–53)
HGB BLD-MCNC: 9.9 G/DL (ref 13.3–17.7)
LABORATORY COMMENT REPORT: NORMAL
MCH RBC QN AUTO: 31 PG (ref 26.5–33)
MCHC RBC AUTO-ENTMCNC: 32.7 G/DL (ref 31.5–36.5)
MCV RBC AUTO: 95 FL (ref 78–100)
PLATELET # BLD AUTO: 145 10E9/L (ref 150–450)
POTASSIUM SERPL-SCNC: 4.3 MMOL/L (ref 3.4–5.3)
PROT SERPL-MCNC: 6 G/DL (ref 6.8–8.8)
RBC # BLD AUTO: 3.19 10E12/L (ref 4.4–5.9)
SARS-COV-2 RNA RESP QL NAA+PROBE: NEGATIVE
SODIUM SERPL-SCNC: 137 MMOL/L (ref 133–144)
SPECIMEN SOURCE: NORMAL
WBC # BLD AUTO: 6.6 10E9/L (ref 4–11)

## 2021-06-17 PROCEDURE — 250N000009 HC RX 250: Performed by: INTERNAL MEDICINE

## 2021-06-17 PROCEDURE — 250N000013 HC RX MED GY IP 250 OP 250 PS 637: Performed by: INTERNAL MEDICINE

## 2021-06-17 PROCEDURE — 94640 AIRWAY INHALATION TREATMENT: CPT

## 2021-06-17 PROCEDURE — 999N000156 HC STATISTIC RCP CONSULT EA 30 MIN

## 2021-06-17 PROCEDURE — 999N000157 HC STATISTIC RCP TIME EA 10 MIN

## 2021-06-17 PROCEDURE — 99221 1ST HOSP IP/OBS SF/LOW 40: CPT | Performed by: INTERNAL MEDICINE

## 2021-06-17 PROCEDURE — 80053 COMPREHEN METABOLIC PANEL: CPT | Performed by: INTERNAL MEDICINE

## 2021-06-17 PROCEDURE — 36415 COLL VENOUS BLD VENIPUNCTURE: CPT | Performed by: INTERNAL MEDICINE

## 2021-06-17 PROCEDURE — 250N000011 HC RX IP 250 OP 636: Performed by: INTERNAL MEDICINE

## 2021-06-17 PROCEDURE — 94640 AIRWAY INHALATION TREATMENT: CPT | Mod: 76

## 2021-06-17 PROCEDURE — 250N000012 HC RX MED GY IP 250 OP 636 PS 637: Performed by: INTERNAL MEDICINE

## 2021-06-17 PROCEDURE — 99232 SBSQ HOSP IP/OBS MODERATE 35: CPT | Performed by: INTERNAL MEDICINE

## 2021-06-17 PROCEDURE — 85027 COMPLETE CBC AUTOMATED: CPT | Performed by: INTERNAL MEDICINE

## 2021-06-17 PROCEDURE — 99221 1ST HOSP IP/OBS SF/LOW 40: CPT | Performed by: PHYSICIAN ASSISTANT

## 2021-06-17 PROCEDURE — 120N000001 HC R&B MED SURG/OB

## 2021-06-17 RX ORDER — CEPHALEXIN 500 MG/1
500 CAPSULE ORAL EVERY 6 HOURS SCHEDULED
Status: DISCONTINUED | OUTPATIENT
Start: 2021-06-17 | End: 2021-06-18 | Stop reason: HOSPADM

## 2021-06-17 RX ORDER — ALBUTEROL SULFATE 90 UG/1
2 AEROSOL, METERED RESPIRATORY (INHALATION) EVERY 4 HOURS PRN
Status: DISCONTINUED | OUTPATIENT
Start: 2021-06-17 | End: 2021-06-18 | Stop reason: HOSPADM

## 2021-06-17 RX ORDER — PREDNISONE 20 MG/1
60 TABLET ORAL
Status: COMPLETED | OUTPATIENT
Start: 2021-06-17 | End: 2021-06-17

## 2021-06-17 RX ORDER — PREDNISONE 20 MG/1
60 TABLET ORAL DAILY
Status: DISCONTINUED | OUTPATIENT
Start: 2021-06-18 | End: 2021-06-18 | Stop reason: HOSPADM

## 2021-06-17 RX ORDER — FUROSEMIDE 10 MG/ML
20 INJECTION INTRAMUSCULAR; INTRAVENOUS EVERY 12 HOURS
Status: DISCONTINUED | OUTPATIENT
Start: 2021-06-17 | End: 2021-06-18

## 2021-06-17 RX ADMIN — CEPHALEXIN 500 MG: 500 CAPSULE ORAL at 23:44

## 2021-06-17 RX ADMIN — BUDESONIDE 0.5 MG: 0.5 INHALANT RESPIRATORY (INHALATION) at 19:42

## 2021-06-17 RX ADMIN — ASPIRIN 81 MG: 81 TABLET ORAL at 09:45

## 2021-06-17 RX ADMIN — CEPHALEXIN 250 MG: 250 CAPSULE ORAL at 13:02

## 2021-06-17 RX ADMIN — FUROSEMIDE 20 MG: 20 TABLET ORAL at 09:45

## 2021-06-17 RX ADMIN — ALBUTEROL SULFATE 2 PUFF: 90 INHALANT RESPIRATORY (INHALATION) at 23:51

## 2021-06-17 RX ADMIN — IPRATROPIUM BROMIDE AND ALBUTEROL SULFATE 3 ML: .5; 3 SOLUTION RESPIRATORY (INHALATION) at 08:26

## 2021-06-17 RX ADMIN — METHYLPREDNISOLONE 40 MG: 40 INJECTION, POWDER, LYOPHILIZED, FOR SOLUTION INTRAMUSCULAR; INTRAVENOUS at 09:52

## 2021-06-17 RX ADMIN — OXYCODONE HYDROCHLORIDE 10 MG: 5 TABLET ORAL at 18:22

## 2021-06-17 RX ADMIN — ALBUTEROL SULFATE 2 PUFF: 90 INHALANT RESPIRATORY (INHALATION) at 18:23

## 2021-06-17 RX ADMIN — FUROSEMIDE 20 MG: 10 INJECTION, SOLUTION INTRAMUSCULAR; INTRAVENOUS at 13:02

## 2021-06-17 RX ADMIN — PREDNISONE 60 MG: 20 TABLET ORAL at 18:22

## 2021-06-17 RX ADMIN — ACETAMINOPHEN 650 MG: 325 TABLET, FILM COATED ORAL at 18:21

## 2021-06-17 RX ADMIN — IPRATROPIUM BROMIDE AND ALBUTEROL SULFATE 3 ML: .5; 3 SOLUTION RESPIRATORY (INHALATION) at 12:06

## 2021-06-17 RX ADMIN — ACETAMINOPHEN 650 MG: 325 TABLET, FILM COATED ORAL at 13:13

## 2021-06-17 RX ADMIN — BUDESONIDE 0.5 MG: 0.5 INHALANT RESPIRATORY (INHALATION) at 08:26

## 2021-06-17 RX ADMIN — FUROSEMIDE 20 MG: 10 INJECTION, SOLUTION INTRAMUSCULAR; INTRAVENOUS at 23:44

## 2021-06-17 RX ADMIN — ACETAMINOPHEN 650 MG: 325 TABLET, FILM COATED ORAL at 23:50

## 2021-06-17 RX ADMIN — CEPHALEXIN 500 MG: 500 CAPSULE ORAL at 18:22

## 2021-06-17 RX ADMIN — METHYLPREDNISOLONE 40 MG: 40 INJECTION, POWDER, LYOPHILIZED, FOR SOLUTION INTRAMUSCULAR; INTRAVENOUS at 00:41

## 2021-06-17 RX ADMIN — IPRATROPIUM BROMIDE AND ALBUTEROL SULFATE 3 ML: .5; 3 SOLUTION RESPIRATORY (INHALATION) at 19:42

## 2021-06-17 RX ADMIN — OXYCODONE HYDROCHLORIDE 10 MG: 5 TABLET ORAL at 13:13

## 2021-06-17 RX ADMIN — Medication 12.5 MG: at 20:52

## 2021-06-17 RX ADMIN — IPRATROPIUM BROMIDE AND ALBUTEROL SULFATE 3 ML: .5; 3 SOLUTION RESPIRATORY (INHALATION) at 16:03

## 2021-06-17 RX ADMIN — Medication 12.5 MG: at 09:45

## 2021-06-17 ASSESSMENT — ACTIVITIES OF DAILY LIVING (ADL)
ADLS_ACUITY_SCORE: 18
ADLS_ACUITY_SCORE: 16
ADLS_ACUITY_SCORE: 18
ADLS_ACUITY_SCORE: 16

## 2021-06-17 ASSESSMENT — MIFFLIN-ST. JEOR: SCORE: 1574.2

## 2021-06-17 NOTE — CONSULTS
"Cardiology Consultation      Dieudonne Chairez MRN# [id number]   YOB: 1960 Age: 60 year old   Date of Admission: 6/16/2021     Reason for consult:  Recent ascending aortic aneurysm repair and aortic valve replacement           Assessment and Plan:     1. Dyspnea from COPD exacerbation and pneumothorax, improving    Management as per hospital team      2. Recent ascending aortic aneurysm repair and bioprosthetic aortic valve replacement 5/28/2021 in [place]    LV function was normal.  No obstructive coronary artery disease on cardiac catheterization preceding the surgery.    Supportive care, no specific cardiac evaluation or therapy required unless specific cardiac symptoms.    May do cardiac rehab as outpatient and follow up at [facility] for cardiology follow up if convenient for him.    Please call with specific cardiac questions.               Chief Complaint:   Shortness of Breath           History of Present Illness:   This patient is a 60 year old male with COPD exacerbation who recently was hospitalized for severe bicuspid aortic stenosis with mean gradient 75 mmHg and ascending aorta 4.6-4.7 cm in [place].  The echocardiogram is 5/16/2021 and the cardiac catheterization is 5/26/2021 without obstructive disease.  Patient had surgery on 5/28/2021 and it was unremarkable.  He received bioprosthetic aortic valve and ascending aortic tube graft.    Currently he is hospitalized with dyspnea thought to be COPD exacerbation.  There is no specific cardiac question.  He did require brief diuresis, but he himself also does not feel like his current symptoms are related to his heart.    Lab Results   Component Value Date    TROPONIN 0.04 06/16/2021            Physical Exam:   Vitals were reviewed  Blood pressure 123/61, pulse 93, temperature 97.9  F (36.6  C), temperature source Oral, resp. rate 22, height 1.753 m (5' 9\"), weight 78.4 kg (172 lb 12.8 oz), SpO2 97 %.  Temperatures:  Current - Temp: 97.9  F " Approved  Expires: 9/1/19  Auth#: [id number]  Rep: Kamaljit Rider    Notified pt and pharmacy  (36.6  C); Max - Temp  Av.7  F (37.1  C)  Min: 97.9  F (36.6  C)  Max: 99.5  F (37.5  C)  Respiration range: Resp  Av  Min: 14  Max: 26  Pulse range: Pulse  Av.4  Min: 81  Max: 104  Blood pressure range: Systolic (24hrs), Av , Min:101 , Max:135   ; Diastolic (24hrs), Av, Min:52, Max:76    Pulse oximetry range: SpO2  Av.2 %  Min: 94 %  Max: 98 %    Intake/Output Summary (Last 24 hours) at 2021 1124  Last data filed at 2021 0945  Gross per 24 hour   Intake 366 ml   Output 700 ml   Net -334 ml     Constitutional:   awake, alert, cooperative, no apparent distress, and appears stated age     Eyes:   Lids and lashes normal, pupils equal, round and reactive to light, extra ocular muscles intact, sclera clear, conjunctiva normal     Neck:   supple, symmetrical, trachea midline, no JVD     Back:   symmetric     Lungs:   splinting     Cardiovascular:   Regular, I/VI systolic.  Sternotomy appears well healed.     Abdomen:   non-tender     Musculoskeletal:   motor strength is 5 out of 5 all extremities bilaterally     Neurologic:   Grossly nonfocal     Skin:   normal skin color, texture, turgor     Additional findings:                  Past Medical History:   I have reviewed this patient's past medical history  Bicuspid aortic valve and ascending aortic aneurysm          Past Surgical History:   I have reviewed this patient's past surgical history  Recent bioprosthetic AVR and ascending aortic repair            Social History:   I have reviewed this patient's social history  Longstanding smoker, recently quit.  Occasional alcohol.          Family History:   I have reviewed this patient's family history  Atrial fibrillation, stroke in mother.  Son with bicuspid aortic valve.          Allergies:   No Known Allergies          Medications:   I have reviewed this patient's current medications  Medications Prior to Admission   Medication Sig Dispense Refill Last Dose     acetaminophen (TYLENOL)  500 MG tablet Take 500-1,000 mg by mouth every 6 hours as needed for mild pain   6/16/2021 at am     albuterol (PROAIR HFA/PROVENTIL HFA/VENTOLIN HFA) 108 (90 Base) MCG/ACT inhaler Inhale 1-2 puffs into the lungs every 4 hours as needed for shortness of breath / dyspnea or wheezing   prn     aspirin 81 MG EC tablet Take 81 mg by mouth daily   Didn't start yet     budesonide (PULMICORT) 0.5 MG/2ML neb solution Take 0.5 mg by nebulization 2 times daily   6/16/2021 at am     cephALEXin (KEFLEX) 500 MG capsule Take 500 mg by mouth every 8 hours x7 days   6/16/2021 at am     cyanocobalamin (VITAMIN B-12) 1000 MCG tablet Take 5,000 mcg by mouth daily        furosemide (LASIX) 20 MG tablet Take 20 mg by mouth daily   6/16/2021 at am     Ipratropium-Albuterol (COMBIVENT IN) Inhale 1 puff into the lungs 4 times daily        losartan (COZAAR) 50 MG tablet Take 50 mg by mouth daily   6/16/2021 at am     metoprolol tartrate (LOPRESSOR) 25 MG tablet Take 12.5 mg by mouth 2 times daily   6/16/2021 at am     oxyCODONE (ROXICODONE) 5 MG tablet Take 5-10 mg by mouth every 6 hours as needed for severe pain   prn     Current Facility-Administered Medications Ordered in Epic   Medication Dose Route Frequency Last Rate Last Admin     acetaminophen (TYLENOL) Suppository 650 mg  650 mg Rectal Q4H PRN         acetaminophen (TYLENOL) tablet 650 mg  650 mg Oral Q4H PRN         albuterol (PROVENTIL) neb solution 2.5 mg  2.5 mg Nebulization Q2H PRN         aspirin EC tablet 81 mg  81 mg Oral Daily   81 mg at 06/17/21 0945     bisacodyl (DULCOLAX) Suppository 10 mg  10 mg Rectal Daily PRN         budesonide (PULMICORT) neb solution 0.5 mg  0.5 mg Nebulization BID   0.5 mg at 06/17/21 0826     furosemide (LASIX) tablet 20 mg  20 mg Oral Daily   20 mg at 06/17/21 0945     hydrALAZINE (APRESOLINE) injection 10 mg  10 mg Intravenous Q4H PRN         HYDROmorphone (DILAUDID) injection 0.2 mg  0.2 mg Intravenous Q2H PRN         ipratropium - albuterol  0.5 mg/2.5 mg/3 mL (DUONEB) neb solution 3 mL  3 mL Nebulization 4x daily   3 mL at 06/17/21 0826     lidocaine (LMX4) cream   Topical Q1H PRN         lidocaine (LMX4) cream   Topical Q1H PRN         lidocaine 1 % 0.1-1 mL  0.1-1 mL Other Q1H PRN         lidocaine 1 % 0.1-1 mL  0.1-1 mL Other Q1H PRN         magnesium hydroxide (MILK OF MAGNESIA) suspension 30 mL  30 mL Oral Daily PRN         melatonin tablet 1 mg  1 mg Oral At Bedtime PRN         methylPREDNISolone sodium succinate (solu-MEDROL) injection 40 mg  40 mg Intravenous Q8H   40 mg at 06/17/21 0952     metoprolol tartrate (LOPRESSOR) half-tab 12.5 mg  12.5 mg Oral BID   12.5 mg at 06/17/21 0945     naloxone (NARCAN) injection 0.2 mg  0.2 mg Intravenous Q2 Min PRN        Or     naloxone (NARCAN) injection 0.4 mg  0.4 mg Intravenous Q2 Min PRN        Or     naloxone (NARCAN) injection 0.2 mg  0.2 mg Intramuscular Q2 Min PRN        Or     naloxone (NARCAN) injection 0.4 mg  0.4 mg Intramuscular Q2 Min PRN         nitroGLYcerin (NITROSTAT) sublingual tablet 0.4 mg  0.4 mg Sublingual Q5 Min PRN         ondansetron (ZOFRAN-ODT) ODT tab 4 mg  4 mg Oral Q6H PRN        Or     ondansetron (ZOFRAN) injection 4 mg  4 mg Intravenous Q6H PRN         oxyCODONE (ROXICODONE) tablet 5-10 mg  5-10 mg Oral Q4H PRN   10 mg at 06/16/21 2119     senna-docusate (SENOKOT-S/PERICOLACE) 8.6-50 MG per tablet 1 tablet  1 tablet Oral BID PRN        Or     senna-docusate (SENOKOT-S/PERICOLACE) 8.6-50 MG per tablet 2 tablet  2 tablet Oral BID PRN         sodium chloride (PF) 0.9% PF flush 3 mL  3 mL Intravenous q1 min prn         sodium chloride (PF) 0.9% PF flush 3 mL  3 mL Intracatheter Q8H         sodium chloride (PF) 0.9% PF flush 3 mL  3 mL Intracatheter q1 min prn         No current Epic-ordered outpatient medications on file.             Review of Systems:   The 10 point Review of Systems is negative other than noted in the HPI            Data:   All laboratory data  reviewed  Results for orders placed or performed during the hospital encounter of 06/16/21 (from the past 24 hour(s))   CTA Chest with Contrast    Narrative    CTA CHEST WITH CONTRAST June 16, 2021 11:28 AM     HISTORY: Two week status post aortic valve replacement now with  shortness of breath.    TECHNIQUE: CT angiogram of the chest was performed following the  administration of 80 mL Isovue-370 contrast. Images are viewed in  multiple planes and 3-D reconstructions will be performed. Radiation  dose for this scan was reduced using automated exposure control,  adjustment of the mA and/or kV according to patient size, or iterative  reconstruction technique.    COMPARISON: None.    FINDINGS:    Vascular examination: There are postsurgical changes consistent with  aortic valve replacement and tube graft reconstruction of the  ascending thoracic aorta. There is a mild hematoma about the surgical  graft with tiny pockets of air. This would be consistent with recent  surgery. In addition, there are tiny pockets of air deep and  superficial to the sternotomy.    The thoracic aorta is otherwise patent without significant stenosis or  aneurysmal dilatation. The great vessels arising from the thoracic  aortic arch are patent without significant stenoses.    There is no evidence for a pulmonary embolism.    Soft tissues: There is a small right pleural effusion. There is a left  pleural pigtail catheter.  There are COPD changes within the lungs.  There is a 6 mm nodule within the medial left lower lobe on image 33.  There are 2-3 mm nodules in the posterior aspects of the left upper  lobe, and right lower lobe. There is a bandlike opacity within the  peripheral right lower lobe. This could represent atelectasis and/or  scarring.    There is subcutaneous emphysema along the right upper anterior  abdominal wall.    There are at least two 6-10 mm hypodensities in the left lobe of the  liver. These are too small to characterize  but most likely represent  cysts. Remaining solid organs in the abdomen appear grossly  unremarkable.      Impression    IMPRESSION:  1. No evidence for pulmonary embolism.  2. Small right pleural effusion.  3. Postsurgical changes of an aortic valve replacement and tube graft  reconstruction of the ascending thoracic aorta. Mild hematoma with  tiny pockets of air about the tube graft, air deep and superficial to  a median sternotomy, and subcutaneous air in the right upper anterior  abdominal wall most likely are postsurgical in nature.    ROSS GUZMAN MD   Blood gas venous and oxyhgb   Result Value Ref Range    Ph Venous 7.30 (L) 7.32 - 7.43 pH    PCO2 Venous 61 (H) 40 - 50 mm Hg    PO2 Venous 27 25 - 47 mm Hg    Bicarbonate Venous 30 (H) 21 - 28 mmol/L    FIO2 3L Nasal Cannula     Oxyhemoglobin Venous 42 %    Base Excess Venous 2.6 mmol/L   Asymptomatic SARS-CoV-2 COVID-19 Virus (Coronavirus) by PCR    Specimen: Nasopharyngeal   Result Value Ref Range    SARS-CoV-2 Virus Specimen Source Nasopharyngeal     SARS-CoV-2 PCR Result NEGATIVE     SARS-CoV-2 PCR Comment (Note)    Procalcitonin   Result Value Ref Range    Procalcitonin <0.05 ng/ml   Comprehensive metabolic panel   Result Value Ref Range    Sodium 137 133 - 144 mmol/L    Potassium 4.3 3.4 - 5.3 mmol/L    Chloride 103 94 - 109 mmol/L    Carbon Dioxide 29 20 - 32 mmol/L    Anion Gap 5 3 - 14 mmol/L    Glucose 154 (H) 70 - 99 mg/dL    Urea Nitrogen 20 7 - 30 mg/dL    Creatinine 0.91 0.66 - 1.25 mg/dL    GFR Estimate 90 >60 mL/min/[1.73_m2]    GFR Estimate If Black >90 >60 mL/min/[1.73_m2]    Calcium 8.4 (L) 8.5 - 10.1 mg/dL    Bilirubin Total 0.3 0.2 - 1.3 mg/dL    Albumin 2.7 (L) 3.4 - 5.0 g/dL    Protein Total 6.0 (L) 6.8 - 8.8 g/dL    Alkaline Phosphatase 79 40 - 150 U/L    ALT 33 0 - 70 U/L    AST 21 0 - 45 U/L   CBC with platelets   Result Value Ref Range    WBC 6.6 4.0 - 11.0 10e9/L    RBC Count 3.19 (L) 4.4 - 5.9 10e12/L    Hemoglobin 9.9 (L) 13.3  - 17.7 g/dL    Hematocrit 30.3 (L) 40.0 - 53.0 %    MCV 95 78 - 100 fl    MCH 31.0 26.5 - 33.0 pg    MCHC 32.7 31.5 - 36.5 g/dL    RDW 14.1 10.0 - 15.0 %    Platelet Count 145 (L) 150 - 450 10e9/L       No results found for: CHOL  No results found for: HDL  No results found for: LDL  No results found for: TRIG  No results found for: CHOLHDLRATIO  No results found for: TSH

## 2021-06-17 NOTE — PROGRESS NOTES
[facility]  Hospitalist Progress Note  Joselito Escobar MD 06/17/2021    Reason for Stay (Diagnosis): dyspnea         Assessment and Plan:      Summary of Stay: Dieudonne Chairez is a 60 year old male admitted on 6/16/2021. He has a past medical history significant for hypertension, COPD, tobacco use disorder, aortic stenosis, aortic aneurysm, and aortic valve replacement with aortic aneurysm repair 2 weeks ago.  He discharged from hospital after his aortic valve replacement with aortic aneurysm repair yesterday.  He returned to emergency room today due to shortness of breath.    Plans today  - change iv solumedrol to po prednisone with taper on discharge  - albuterol neb q2h prn  - lasix 20 mg iv q12h today for further diuresis  - oxygen with goal sats 90-95%     1.  possible COPD exacerbation.  Initially requiring BiPAP therapy in the emergency room.  Improved with nebs.  Currently oxygenating well and symptoms much improved on 3 L of supplemental oxygen per nasal cannula.  - transition from IV solumedrol to prednisone today  -Duo nebs 4 times a day.  -Albuterol more often if needed.  -Supplemental oxygen.  -procal level undetectable; no e/o PNA  - explained to pt and dtr that time - more than anything else - will improve his dyspnea sx as he is continuing to recover from major cardiac surgery in the setting of underlying chronic resp failure (COPD)     2.  Acute hypoxic and hypercapnic respiratory failure.  likely multifactorial, with largest contributor being recent surgery (AVR and aneurysm repair) in the setting of underlying COPD.  suspect some degree of volume overload from recent hospitalization and COPD exac contributing as well  -IV furosemide 20 mg IV q12h today  -Treat COPD as noted above.  -Supplemental oxygen as needed.        4.  Recent pneumothorax with chest tube in place.  No obvious residual pneumothorax on imaging.  -Thoracic surgery consult to manage chest tube.     5.   "Tobacco use disorder.  Last cigarette more than 2 weeks ago.  -I did encourage continued smoking cessation.     6.  Recent aortic valve replacement.    -Cardiology consult appreciated.     7.  Hypertension hx     Updated dtr at bedside today     Diet: Combination Diet Low Saturated Fat Na <2400mg Diet    DVT Prophylaxis: Pneumatic Compression Devices  Farah Catheter: not present  Code Status: Full Code    Dispo:  Home tomorrow anticipated          Interval History (Subjective):      Multiple complaints today.  C/o dyspnea.  C/o not receiving his home albuterol inhaler.  C/o feeling fatigued.  Has oxygen at home; recently prescribed.  Wonders why he feels SOB after recent valve replacement surgery                  Physical Exam:      Last Vital Signs:  /61   Pulse 93   Temp 97.9  F (36.6  C) (Oral)   Resp 20   Ht 1.753 m (5' 9\")   Wt 78.4 kg (172 lb 12.8 oz)   SpO2 95%   BMI 25.52 kg/m        Intake/Output Summary (Last 24 hours) at 6/17/2021 1408  Last data filed at 6/17/2021 1330  Gross per 24 hour   Intake 366 ml   Output 1075 ml   Net -709 ml       Constitutional: Awake, alert, cooperative, no apparent distress   Respiratory: Clear to auscultation bilaterally, no crackles or wheezing   Cardiovascular: Regular rate and rhythm, 1/6 JEFFERSON, normal S1 and S2, and no murmur noted   Abdomen: Normal bowel sounds, soft, non-distended, non-tender   Skin: No rashes, no cyanosis, dry to touch   Neuro: Alert and oriented x3, no weakness, numbness, memory loss   Extremities: No edema, normal range of motion   Other(s): Sternotomy site incision c/d/i without signs of infection       All other systems: Negative          Medications:      All current medications were reviewed with changes reflected in problem list.         Data:      All new lab and imaging data was reviewed.   Labs:  Recent Labs   Lab 06/17/21  0621 06/16/21  0953   WBC 6.6 8.9   HGB 9.9* 11.6*   HCT 30.3* 36.5*   MCV 95 98   * 152    "   Imaging:   No results found for this or any previous visit (from the past 24 hour(s)).

## 2021-06-17 NOTE — PLAN OF CARE
4hr RN. LS dim. Sats 95% on 2LNC. Did take off oxygen and desated to 87% while talking. Chest tube intact to back. Dressing CDI. Midline chest incision CDI. C/o pain at the chest incision site. OXycodoen and tylenol given with decrease in pain. TEDS on. Good uo. On lasix. Tele SR

## 2021-06-17 NOTE — PLAN OF CARE
VSS. On 1.5L of oxygen, weaned down from 2.5L on shift. Scheduled nebs given. LS clear. Chest tube intact to left middle back, clamped. Incision from previous open heart surgery to chest intact. Oxycodone given x 1 for pain at incision. Edematous in lower extremities, lasix started with good output. Receiving solumedrol. Tele- SR/ST, on metoprolol. Moving SBA with gait belt. Low sodium diet. Thoracic and Cardiology following.

## 2021-06-17 NOTE — CONSULTS
THORACIC SURGERY CONSULT NOTE    Consult Reason: Pneumothorax    HPI: Dieudonne Chairez is a 60 year old male with a history of aortic aneurysm repair and aortic valve replacement about 2 weeks ago in [place], [place] with an [facility].  He developed a pneumothorax and chest tube was placed. He was discharged on 6/15/2021 and plan was to start cardiac rehab near his daughter in [place].  He became acutely short of breath with hypoxia and presented to the ED where he was given methylprednisolone, nebs and oxygen. Given his recent cardiac surgery, there was concern for possible fluid overload and he has been given IV furosemide. He is feeling better, but continues to have SOB. Left chest tube feels ok with no significant pain. Current daily smoker. He is planning to follow up with his cardiothoracic team in [place] next week for chest tube removal.     A/P: Patient is a 60 year old male with recent aortic aneurysm repair, and aortic valve replacement and left pneumothorax s/p chest tube  -Heimlich valve in place, no leak  -CXR with no pneumothorax  -Plan to follow up with his Cardiothoracic team early next week for chest tube removal  -No new other recommendations    Patient and plan discussed with attending.    Thank you for the opportunity to participate in the care of this patient.    PMH:  No past medical history on file.  AAA   AORTIC STENOSIS    PSH:  No past surgical history on file.  Aortic valve replacement  AAA repair    FH:  family history is not on file.  See  for family hx    SH:  Current Tobacco use    Allergies:  No Known Allergies    Home Meds:  Medications Prior to Admission   Medication Sig Dispense Refill Last Dose     acetaminophen (TYLENOL) 500 MG tablet Take 500-1,000 mg by mouth every 6 hours as needed for mild pain   6/16/2021 at am     albuterol (PROAIR HFA/PROVENTIL HFA/VENTOLIN HFA) 108 (90 Base) MCG/ACT inhaler Inhale 1-2 puffs into the lungs every 4 hours as needed for shortness  of breath / dyspnea or wheezing   prn     aspirin 81 MG EC tablet Take 81 mg by mouth daily   Didn't start yet     budesonide (PULMICORT) 0.5 MG/2ML neb solution Take 0.5 mg by nebulization 2 times daily   6/16/2021 at am     cephALEXin (KEFLEX) 500 MG capsule Take 500 mg by mouth every 8 hours x7 days   6/16/2021 at am     cyanocobalamin (VITAMIN B-12) 1000 MCG tablet Take 5,000 mcg by mouth daily        furosemide (LASIX) 20 MG tablet Take 20 mg by mouth daily   6/16/2021 at am     Ipratropium-Albuterol (COMBIVENT IN) Inhale 1 puff into the lungs 4 times daily        losartan (COZAAR) 50 MG tablet Take 50 mg by mouth daily   6/16/2021 at am     metoprolol tartrate (LOPRESSOR) 25 MG tablet Take 12.5 mg by mouth 2 times daily   6/16/2021 at am     oxyCODONE (ROXICODONE) 5 MG tablet Take 5-10 mg by mouth every 6 hours as needed for severe pain   prn       ROS: SOB, left chest pain, cough    Physical Exam:  Temp:  [97.9  F (36.6  C)-99.5  F (37.5  C)] 97.9  F (36.6  C)  Pulse:  [] 93  Resp:  [16-26] 22  BP: (104-135)/(52-76) 123/61  SpO2:  [94 %-98 %] 96 %    Gen: NAD, resting comfortably in bed  Lungs: Minimally tachypneic with conversation  Neuro: AOx3    Labs:  CBC  Recent Labs   Lab 06/17/21  0621 06/16/21  0953   WBC 6.6 8.9   HGB 9.9* 11.6*   * 152     BMP  Recent Labs   Lab 06/17/21  0621 06/16/21  0953    137   POTASSIUM 4.3 4.4   CHLORIDE 103 103   CO2 29 30   BUN 20 12   CR 0.91 0.85   * 105*     LFT  Recent Labs   Lab 06/17/21  0621 06/16/21  0953   AST 21 29   ALT 33 37   ALKPHOS 79 102   BILITOTAL 0.3 0.5   ALBUMIN 2.7* 3.3*     Imaging:  CXR 6/16 with no ptx          Lakisha Morrison PA-C

## 2021-06-17 NOTE — PROGRESS NOTES
"[facility]AT     Date: 06/17/21  Admission Dx: COPD exacerbation  Pulmonary History: COPD hx, tobacco use disorder, quit smoking 2 weeks ago  Home Nebulizer/MDI Use: Albuterol MDI Q4 prn, Pulmicort BID, Combivent MDI QID  Home Oxygen: None  Acuity Level (RCAT flow sheet): 3  Aerosol Therapy initiated: Duoneb QID, Pulmicort BID, Albuterol MDI Q4 prn, Albuterol Q2 prn      Pulmonary Hygiene initiated: Coughing techniques      Volume Expansion initiated: Incentive Spirometry      Current Oxygen Requirements: Nasal cannula 2 LPM  Current SpO2: 95%    Re-evaluation date: 06/20/21    Patient Education: Discussed use and benefits of respiratory medications.       See \"RT Assessments\" flow sheet for patient assessment scoring and Acuity Level Details.               "

## 2021-06-17 NOTE — PLAN OF CARE
"Tele: SR w/ inverted Ts  Vitals: /61 (BP Location: Left arm)   Pulse 84   Temp 98.7  F (37.1  C) (Oral)   Resp 18   Ht 1.753 m (5' 9\")   Wt 78.5 kg (173 lb)   SpO2 96%   BMI 25.55 kg/m    Pain: Denies. Reports some incisional discomfort when coughing.  Neuro: A&Ox4. Able to make needs known.  Cardiac: Denies CP  Resp: Weaned down to RA. Sats maintained >90%. LS diminished. L posterior chest tube drain present (clamped).  GI/: Voiding  Skin: Midline chest incision approximated and YANETH. No drainage. +2 edema BLEs.   Mobility: SBA  Plan: Continue solumedrol and nebs for COPD exacerbation.     Addendum: Weaned off O2 this AM and sats 93-95%. However, pt states he felt more SOB this morning. Placed back on 1.5L to assist with symptoms. Pt also stated he used home albuterol inhaler this morning (not listed in MAR). Placed home inhaler in lock box in room at this time.  "

## 2021-06-17 NOTE — PLAN OF CARE
VSS on 2 LPM NC, pt felt SOB and wanted O2 increased, A/OX4, [place], LS dim, 5/10 chest pain, +2 BLE edema, SR on tele, discharge possible tomorrow.

## 2021-06-18 VITALS
RESPIRATION RATE: 20 BRPM | WEIGHT: 170.2 LBS | HEIGHT: 69 IN | SYSTOLIC BLOOD PRESSURE: 124 MMHG | BODY MASS INDEX: 25.21 KG/M2 | OXYGEN SATURATION: 98 % | DIASTOLIC BLOOD PRESSURE: 61 MMHG | TEMPERATURE: 98.3 F | HEART RATE: 100 BPM

## 2021-06-18 LAB
ANION GAP SERPL CALCULATED.3IONS-SCNC: 4 MMOL/L (ref 3–14)
BASOPHILS # BLD AUTO: 0 10E9/L (ref 0–0.2)
BASOPHILS NFR BLD AUTO: 0 %
BUN SERPL-MCNC: 20 MG/DL (ref 7–30)
CALCIUM SERPL-MCNC: 8.9 MG/DL (ref 8.5–10.1)
CHLORIDE SERPL-SCNC: 99 MMOL/L (ref 94–109)
CO2 SERPL-SCNC: 35 MMOL/L (ref 20–32)
CREAT SERPL-MCNC: 0.94 MG/DL (ref 0.66–1.25)
DIFFERENTIAL METHOD BLD: ABNORMAL
EOSINOPHIL # BLD AUTO: 0 10E9/L (ref 0–0.7)
EOSINOPHIL NFR BLD AUTO: 0 %
ERYTHROCYTE [DISTWIDTH] IN BLOOD BY AUTOMATED COUNT: 14.3 % (ref 10–15)
GFR SERPL CREATININE-BSD FRML MDRD: 86 ML/MIN/{1.73_M2}
GLUCOSE SERPL-MCNC: 132 MG/DL (ref 70–99)
HCT VFR BLD AUTO: 31.4 % (ref 40–53)
HGB BLD-MCNC: 10.2 G/DL (ref 13.3–17.7)
IMM GRANULOCYTES # BLD: 0 10E9/L (ref 0–0.4)
IMM GRANULOCYTES NFR BLD: 0.4 %
LYMPHOCYTES # BLD AUTO: 0.8 10E9/L (ref 0.8–5.3)
LYMPHOCYTES NFR BLD AUTO: 8.1 %
MCH RBC QN AUTO: 31.1 PG (ref 26.5–33)
MCHC RBC AUTO-ENTMCNC: 32.5 G/DL (ref 31.5–36.5)
MCV RBC AUTO: 96 FL (ref 78–100)
MONOCYTES # BLD AUTO: 0.6 10E9/L (ref 0–1.3)
MONOCYTES NFR BLD AUTO: 5.4 %
NEUTROPHILS # BLD AUTO: 8.9 10E9/L (ref 1.6–8.3)
NEUTROPHILS NFR BLD AUTO: 86.1 %
NRBC # BLD AUTO: 0 10*3/UL
NRBC BLD AUTO-RTO: 0 /100
PLATELET # BLD AUTO: 178 10E9/L (ref 150–450)
POTASSIUM SERPL-SCNC: 3.8 MMOL/L (ref 3.4–5.3)
RBC # BLD AUTO: 3.28 10E12/L (ref 4.4–5.9)
SODIUM SERPL-SCNC: 138 MMOL/L (ref 133–144)
WBC # BLD AUTO: 10.3 10E9/L (ref 4–11)

## 2021-06-18 PROCEDURE — 80048 BASIC METABOLIC PNL TOTAL CA: CPT | Performed by: INTERNAL MEDICINE

## 2021-06-18 PROCEDURE — 999N000157 HC STATISTIC RCP TIME EA 10 MIN

## 2021-06-18 PROCEDURE — 94640 AIRWAY INHALATION TREATMENT: CPT | Mod: 76

## 2021-06-18 PROCEDURE — 85025 COMPLETE CBC W/AUTO DIFF WBC: CPT | Performed by: INTERNAL MEDICINE

## 2021-06-18 PROCEDURE — 94640 AIRWAY INHALATION TREATMENT: CPT

## 2021-06-18 PROCEDURE — 250N000013 HC RX MED GY IP 250 OP 250 PS 637: Performed by: INTERNAL MEDICINE

## 2021-06-18 PROCEDURE — 36415 COLL VENOUS BLD VENIPUNCTURE: CPT | Performed by: INTERNAL MEDICINE

## 2021-06-18 PROCEDURE — 99238 HOSP IP/OBS DSCHRG MGMT 30/<: CPT | Performed by: INTERNAL MEDICINE

## 2021-06-18 PROCEDURE — 250N000012 HC RX MED GY IP 250 OP 636 PS 637: Performed by: INTERNAL MEDICINE

## 2021-06-18 PROCEDURE — 250N000009 HC RX 250: Performed by: INTERNAL MEDICINE

## 2021-06-18 RX ORDER — FUROSEMIDE 20 MG
20 TABLET ORAL DAILY
Status: DISCONTINUED | OUTPATIENT
Start: 2021-06-18 | End: 2021-06-18 | Stop reason: HOSPADM

## 2021-06-18 RX ORDER — ALBUTEROL SULFATE 1.25 MG/3ML
1.25 SOLUTION RESPIRATORY (INHALATION) EVERY 4 HOURS PRN
Qty: 150 ML | Refills: 1 | Status: SHIPPED | OUTPATIENT
Start: 2021-06-18

## 2021-06-18 RX ORDER — PREDNISONE 20 MG/1
TABLET ORAL
Qty: 20 TABLET | Refills: 0 | Status: SHIPPED | OUTPATIENT
Start: 2021-06-18

## 2021-06-18 RX ADMIN — ALBUTEROL SULFATE 2 PUFF: 90 INHALANT RESPIRATORY (INHALATION) at 06:05

## 2021-06-18 RX ADMIN — OXYCODONE HYDROCHLORIDE 5 MG: 5 TABLET ORAL at 05:59

## 2021-06-18 RX ADMIN — CEPHALEXIN 500 MG: 500 CAPSULE ORAL at 06:00

## 2021-06-18 RX ADMIN — ASPIRIN 81 MG: 81 TABLET ORAL at 08:45

## 2021-06-18 RX ADMIN — IPRATROPIUM BROMIDE AND ALBUTEROL SULFATE 3 ML: .5; 3 SOLUTION RESPIRATORY (INHALATION) at 11:34

## 2021-06-18 RX ADMIN — ACETAMINOPHEN 650 MG: 325 TABLET, FILM COATED ORAL at 05:59

## 2021-06-18 RX ADMIN — Medication 12.5 MG: at 08:45

## 2021-06-18 RX ADMIN — PREDNISONE 60 MG: 20 TABLET ORAL at 08:44

## 2021-06-18 RX ADMIN — CEPHALEXIN 500 MG: 500 CAPSULE ORAL at 11:43

## 2021-06-18 RX ADMIN — FUROSEMIDE 20 MG: 20 TABLET ORAL at 08:45

## 2021-06-18 RX ADMIN — BUDESONIDE 0.5 MG: 0.5 INHALANT RESPIRATORY (INHALATION) at 08:36

## 2021-06-18 RX ADMIN — IPRATROPIUM BROMIDE AND ALBUTEROL SULFATE 3 ML: .5; 3 SOLUTION RESPIRATORY (INHALATION) at 15:32

## 2021-06-18 RX ADMIN — IPRATROPIUM BROMIDE AND ALBUTEROL SULFATE 3 ML: .5; 3 SOLUTION RESPIRATORY (INHALATION) at 08:37

## 2021-06-18 ASSESSMENT — ACTIVITIES OF DAILY LIVING (ADL)
ADLS_ACUITY_SCORE: 18

## 2021-06-18 ASSESSMENT — MIFFLIN-ST. JEOR: SCORE: 1562.4

## 2021-06-18 NOTE — PLAN OF CARE
Patient discharged home via private car, transported via wheelchair with support staff.  Patient verbalized and received copy of discharge instructions.  Patient received medications filled by discharge Pharmacy. Patient will  albuterol prescription at [facility].  Personal belongings gathered and sent with patient.  VSS. IV removed prior to discharge.

## 2021-06-18 NOTE — DISCHARGE SUMMARY
Service Date: 06/18/2021  Discharge Date: 06/18/2021    PRINCIPAL FINAL DIAGNOSES:   1.  Chronic obstructive pulmonary disease exacerbation.  2.  Acute hypoxic and hypercapnic respiratory failure, multifactorial, with largest contributor from recent surgery, which included aortic valve replacement and aneurysm repair in the setting of underlying COPD.  Suspect some degree of volume overload from recent hospitalization and COPD exacerbation contributing as well.  3.  Recent pneumothorax with a chest tube currently in place and plans for removal within the next week, being managed by outside Thoracic Surgery service.  4.  History of tobacco use with last cigarette smoked 2 weeks ago.  5.  Status post recent aortic valve replacement and aneurysm repair.  6.  Hypertension history.    PRINCIPAL PROCEDURES THIS ADMISSION:     1.  CT angiogram of the chest.  2.  Chest x-ray.  3.  Intravenous steroids.  4.  COVID-19 testing that was negative.  5.  Intravenous diuretic.  6.  Cardiology consultation.    REASON FOR ADMISSION:  Please see dictated history and physical.  In brief, Mr. Chairez is a 60-year-old male with a history of COPD and recent aortic valve replacement and aneurysm repair surgery, who presented to the hospital for concerns about worsening shortness of breath.  Please see dictated history and physical for details.    HOSPITAL COURSE:  Acute on chronic hypoxic and hypercapnic respiratory failure:  Suspect multifactorial, in largest part due to a recent cardiothoracic surgery in the setting of underlying COPD.  The patient is frustrated that he that he has continued shortness of breath after surgery.  I explained to him that it will likely take some time for him for his respiratory status to return to his baseline given significant surgery that he had performed.  The patient also has continued chest tube in place for pneumothorax during surgery.    The patient was treated for volume overload this admission  with IV diuresis with improvement of symptoms.  He also received IV steroids for possible COPD exacerbation component.  Thoracic Surgery consulted and felt that chest tube was functioning properly and not a contributing factor to his acute issues.    The patient's symptoms improved and he requested discharge from the hospital today.  This appears reasonable.  He does have home oxygen already in place from his last hospitalization.    The patient was discharged to home with his daughter today.  He is staying with his daughter here in the [facility] while he is recovering from his recent surgery.    DISCHARGE MEDICATIONS:     1.  Acetaminophen as needed for pain.  2.  Albuterol as needed for shortness of breath.  3.  Aspirin 81 mg daily.  4.  Pulmicort 0.5 mg nebulized twice a day.  5.  Keflex 500 mg every 8 hours for 7 days.  The patient was taking this prior to admission for some redness around his chest tube insertion site.  6.  Combivent 1 puff 4 times a day.  7.  Vitamin B12 5000 mcg by mouth daily.  8.  Lasix 20 mg daily.  9.  Losartan 50 mg daily.  10.  Metoprolol 12.5 mg twice a day.  11.  Oxycodone as needed for pain.  12.  Prednisone 60 mg daily for 3 days, then 40 mg daily for 3 days, then 20 mg daily for 3 days, then 10 mg daily for 3 days, then stop.    FOLLOWUP INSTRUCTIONS:     1.  Follow up with your primary MD within the next 1-3 weeks for routine followup after hospitalization.  2.  New medications on discharge include prednisone taper and albuterol nebulizer.    I examined the patient on day of discharge.      Joselito Escobar MD        D: 2021   T: 2021   MT: IAN    Name:     BENTLEY SHIPLEY  MRN:      [id number]        Account:      [id number]   :      1960           Service Date: 2021                                  Discharge Date: 2021     Document: [id number]

## 2021-06-18 NOTE — PROGRESS NOTES
"Pt seen and examined.  Feels well today.  Breathing \"much better\" than on his initial appearance in the ER.  Requesting to go home today    Pt appears appropriate for discharge home today.  Already has home oxygen  "

## 2021-06-18 NOTE — PLAN OF CARE
"Tele: SR  Vitals: /72 (BP Location: Left arm)   Pulse 72   Temp 97.7  F (36.5  C) (Oral)   Resp 20   Ht 1.753 m (5' 9\")   Wt 78.4 kg (172 lb 12.8 oz)   SpO2 99%   BMI 25.52 kg/m    Pain: Incisional pain mid chest. PRN tylenol given. Pt declines PRN oxycodone.  Neuro: A&Ox4. Able to make needs known.  Cardiac: Denies CP.  Resp: [place]. 2L NC in place to assist w/ symptoms. Pt utilizing PRN albuterol inhaler as needed. LS clear/dim. Clamped chest tube in place to R posterior.  GI/: Voiding w/ good UOP.  Skin: Mid chest incision - WNL. Left YANETH.  Mobility: SBA/ind.  Plan: Ongoing diuresis and COPD mgmt. Possible discharge today.    "

## 2021-06-18 NOTE — PLAN OF CARE
6/10 incisional surgery pain, decrease in pain with 10 mg oxycodone and tylenol. Pt refused bed alarms. Author educated on safety and preventing falls. Pt verbalized understanding and still refused. Pt verbalized using call light for staff assistance if feeling weak, unsteady, or dizzy. Continue to monitor and with plan of care.

## 2021-06-18 NOTE — PLAN OF CARE
A&OX4. Up independently. VSS on 2 Lt NC. [place], Lungs sounds diminished. Denies any pain. Tele: SR. +1 BLE edema. Discharge instruction given to patient, expressed understanding. Pt. Is going to  Albuterol Neb solution from [facility] pharmacy in [place], Pt. received Prednisone med from [place] discharge pharmacy. Pt. Is waiting to be picked up from daughter.

## 2021-06-29 DIAGNOSIS — Z95.2 S/P AVR (AORTIC VALVE REPLACEMENT): Primary | ICD-10-CM

## 2021-06-29 DIAGNOSIS — Z98.890 S/P ASCENDING AORTIC ANEURYSM REPAIR: ICD-10-CM

## 2021-06-29 DIAGNOSIS — Z86.79 S/P ASCENDING AORTIC ANEURYSM REPAIR: ICD-10-CM

## 2021-07-04 NOTE — LETTER
Letter by System, Provider Not In at      Author: System, Provider Not In Service: -- Author Type: --    Filed:  Encounter Date: 5/26/2021 Status: (Other)         05/26/21      Dieudonne Chairez  [address]  [place] [address]    Dear Dieudonne,    As a valued [facility] patient, your healthcare needs are our priority. Our clinic records indicate we have attempted to contact you to schedule a consultation with Dr. Erlin Collado, but we have not heard back from you. If you wish to schedule your appointment please contact our office at your convenience. If you have already made an appointment, please disregard this letter. We can be reached at: [phone]    Sincerely,    [facility]

## 2021-07-07 DIAGNOSIS — Z95.2 S/P AVR (AORTIC VALVE REPLACEMENT): ICD-10-CM

## 2021-07-07 DIAGNOSIS — Z86.79 S/P ASCENDING AORTIC ANEURYSM REPAIR: ICD-10-CM

## 2021-07-07 DIAGNOSIS — Z98.890 S/P ASCENDING AORTIC ANEURYSM REPAIR: ICD-10-CM

## 2021-07-07 PROCEDURE — 80048 BASIC METABOLIC PNL TOTAL CA: CPT | Performed by: NURSE PRACTITIONER

## 2021-07-07 PROCEDURE — 36415 COLL VENOUS BLD VENIPUNCTURE: CPT | Performed by: NURSE PRACTITIONER

## 2021-07-08 LAB
ANION GAP SERPL CALCULATED.3IONS-SCNC: 2 MMOL/L (ref 3–14)
BUN SERPL-MCNC: 17 MG/DL (ref 7–30)
CALCIUM SERPL-MCNC: 8.8 MG/DL (ref 8.5–10.1)
CHLORIDE SERPL-SCNC: 102 MMOL/L (ref 94–109)
CO2 SERPL-SCNC: 36 MMOL/L (ref 20–32)
CREAT SERPL-MCNC: 0.93 MG/DL (ref 0.66–1.25)
GFR SERPL CREATININE-BSD FRML MDRD: 88 ML/MIN/{1.73_M2}
GLUCOSE SERPL-MCNC: 100 MG/DL (ref 70–99)
POTASSIUM SERPL-SCNC: 4.3 MMOL/L (ref 3.4–5.3)
SODIUM SERPL-SCNC: 140 MMOL/L (ref 133–144)

## 2021-07-14 ENCOUNTER — HOSPITAL ENCOUNTER (OUTPATIENT)
Dept: CARDIAC REHAB | Facility: CLINIC | Age: 61
End: 2021-07-14
Attending: INTERNAL MEDICINE
Payer: MEDICARE

## 2021-07-14 DIAGNOSIS — Z98.890 S/P AORTIC ANEURYSM REPAIR: ICD-10-CM

## 2021-07-14 DIAGNOSIS — Z86.79 S/P AORTIC ANEURYSM REPAIR: ICD-10-CM

## 2021-07-14 DIAGNOSIS — Z95.2 S/P AVR: ICD-10-CM

## 2021-07-14 PROCEDURE — 93798 PHYS/QHP OP CAR RHAB W/ECG: CPT

## 2021-08-29 ENCOUNTER — HEALTH MAINTENANCE LETTER (OUTPATIENT)
Age: 61
End: 2021-08-29

## 2021-10-24 ENCOUNTER — HEALTH MAINTENANCE LETTER (OUTPATIENT)
Age: 61
End: 2021-10-24

## 2022-10-15 ENCOUNTER — HEALTH MAINTENANCE LETTER (OUTPATIENT)
Age: 62
End: 2022-10-15

## 2023-10-29 ENCOUNTER — HEALTH MAINTENANCE LETTER (OUTPATIENT)
Age: 63
End: 2023-10-29

## 2024-01-07 ENCOUNTER — HEALTH MAINTENANCE LETTER (OUTPATIENT)
Age: 64
End: 2024-01-07